# Patient Record
Sex: FEMALE | Race: BLACK OR AFRICAN AMERICAN | Employment: OTHER | URBAN - METROPOLITAN AREA
[De-identification: names, ages, dates, MRNs, and addresses within clinical notes are randomized per-mention and may not be internally consistent; named-entity substitution may affect disease eponyms.]

---

## 2018-03-23 ENCOUNTER — OFFICE VISIT (OUTPATIENT)
Dept: FAMILY MEDICINE CLINIC | Age: 83
End: 2018-03-23
Payer: MEDICARE

## 2018-03-23 ENCOUNTER — HOSPITAL ENCOUNTER (OUTPATIENT)
Age: 83
Discharge: HOME | End: 2018-03-25
Payer: MEDICARE

## 2018-03-23 VITALS
HEART RATE: 84 BPM | TEMPERATURE: 98.5 F | RESPIRATION RATE: 16 BRPM | HEIGHT: 65 IN | SYSTOLIC BLOOD PRESSURE: 138 MMHG | DIASTOLIC BLOOD PRESSURE: 74 MMHG | BODY MASS INDEX: 32.65 KG/M2 | WEIGHT: 196 LBS | OXYGEN SATURATION: 97 %

## 2018-03-23 DIAGNOSIS — K21.9 GASTROESOPHAGEAL REFLUX DISEASE WITHOUT ESOPHAGITIS: ICD-10-CM

## 2018-03-23 DIAGNOSIS — N39.0 URINARY TRACT INFECTION WITHOUT HEMATURIA, SITE UNSPECIFIED: ICD-10-CM

## 2018-03-23 DIAGNOSIS — E78.5 HYPERLIPIDEMIA, UNSPECIFIED HYPERLIPIDEMIA TYPE: ICD-10-CM

## 2018-03-23 DIAGNOSIS — I10 ESSENTIAL HYPERTENSION: ICD-10-CM

## 2018-03-23 DIAGNOSIS — G47.00 INSOMNIA, UNSPECIFIED TYPE: ICD-10-CM

## 2018-03-23 DIAGNOSIS — M48.061 SPINAL STENOSIS OF LUMBAR REGION WITHOUT NEUROGENIC CLAUDICATION: Primary | ICD-10-CM

## 2018-03-23 LAB
APPEARANCE FLUID: CLEAR
BILIRUBIN, POC: NORMAL
BLOOD URINE, POC: NORMAL
CLARITY, POC: CLEAR
COLOR, POC: YELLOW
GLUCOSE URINE, POC: NORMAL
KETONES, POC: NORMAL
LEUKOCYTE EST, POC: NORMAL
NITRITE, POC: NORMAL
PH, POC: 5.5
PROTEIN, POC: NORMAL
SPECIFIC GRAVITY, POC: 1.02
UROBILINOGEN, POC: 0.2

## 2018-03-23 PROCEDURE — 87088 URINE BACTERIA CULTURE: CPT

## 2018-03-23 PROCEDURE — 99214 OFFICE O/P EST MOD 30 MIN: CPT | Performed by: NURSE PRACTITIONER

## 2018-03-23 RX ORDER — FAMOTIDINE 20 MG/1
20 TABLET, FILM COATED ORAL DAILY
Qty: 60 TABLET | Refills: 1 | Status: CANCELLED | OUTPATIENT
Start: 2018-03-23

## 2018-03-23 RX ORDER — TRAMADOL HYDROCHLORIDE 50 MG/1
50 TABLET ORAL 2 TIMES DAILY
Qty: 60 TABLET | Refills: 0 | Status: SHIPPED | OUTPATIENT
Start: 2018-03-23 | End: 2018-04-24 | Stop reason: SDUPTHER

## 2018-03-23 RX ORDER — ZOLPIDEM TARTRATE 10 MG/1
TABLET ORAL NIGHTLY PRN
Status: CANCELLED | OUTPATIENT
Start: 2018-03-23

## 2018-03-23 RX ORDER — FERROUS SULFATE 325(65) MG
325 TABLET ORAL
Qty: 30 TABLET | Refills: 2 | Status: SHIPPED | OUTPATIENT
Start: 2018-03-23

## 2018-03-23 RX ORDER — TRAZODONE HYDROCHLORIDE 50 MG/1
50 TABLET ORAL NIGHTLY
Qty: 30 TABLET | Refills: 1 | Status: SHIPPED | OUTPATIENT
Start: 2018-03-23 | End: 2018-08-19 | Stop reason: ALTCHOICE

## 2018-03-23 RX ORDER — ATORVASTATIN CALCIUM 10 MG/1
10 TABLET, FILM COATED ORAL DAILY
Qty: 30 TABLET | Refills: 2 | Status: SHIPPED | OUTPATIENT
Start: 2018-03-23 | End: 2018-05-15 | Stop reason: SDUPTHER

## 2018-03-23 RX ORDER — DOCUSATE SODIUM 100 MG/1
100 CAPSULE, LIQUID FILLED ORAL 2 TIMES DAILY
Qty: 60 CAPSULE | Refills: 2 | Status: SHIPPED | OUTPATIENT
Start: 2018-03-23 | End: 2018-05-15 | Stop reason: SDUPTHER

## 2018-03-23 ASSESSMENT — ENCOUNTER SYMPTOMS
BLURRED VISION: 0
DOUBLE VISION: 0
DIARRHEA: 0
BACK PAIN: 1
COUGH: 0
WHEEZING: 0
CONSTIPATION: 1
NAUSEA: 0
SHORTNESS OF BREATH: 0
VOMITING: 0

## 2018-03-25 LAB — URINE CULTURE, ROUTINE: NORMAL

## 2018-04-24 ENCOUNTER — OFFICE VISIT (OUTPATIENT)
Dept: FAMILY MEDICINE CLINIC | Age: 83
End: 2018-04-24
Payer: MEDICARE

## 2018-04-24 VITALS
DIASTOLIC BLOOD PRESSURE: 72 MMHG | HEIGHT: 65 IN | OXYGEN SATURATION: 97 % | HEART RATE: 76 BPM | SYSTOLIC BLOOD PRESSURE: 132 MMHG | TEMPERATURE: 98.2 F | BODY MASS INDEX: 31.82 KG/M2 | WEIGHT: 191 LBS

## 2018-04-24 DIAGNOSIS — G47.00 INSOMNIA, UNSPECIFIED TYPE: ICD-10-CM

## 2018-04-24 DIAGNOSIS — K21.9 GASTROESOPHAGEAL REFLUX DISEASE WITHOUT ESOPHAGITIS: ICD-10-CM

## 2018-04-24 DIAGNOSIS — M48.061 SPINAL STENOSIS OF LUMBAR REGION WITHOUT NEUROGENIC CLAUDICATION: Primary | ICD-10-CM

## 2018-04-24 PROCEDURE — 99214 OFFICE O/P EST MOD 30 MIN: CPT | Performed by: NURSE PRACTITIONER

## 2018-04-24 RX ORDER — PANTOPRAZOLE SODIUM 40 MG/1
40 TABLET, DELAYED RELEASE ORAL DAILY
Qty: 30 TABLET | Refills: 3 | Status: SHIPPED | OUTPATIENT
Start: 2018-04-24 | End: 2018-07-16 | Stop reason: ALTCHOICE

## 2018-04-24 RX ORDER — TRAMADOL HYDROCHLORIDE 50 MG/1
50 TABLET ORAL 2 TIMES DAILY
Qty: 60 TABLET | Refills: 0 | Status: SHIPPED | OUTPATIENT
Start: 2018-04-24 | End: 2018-05-15 | Stop reason: SDUPTHER

## 2018-04-24 RX ORDER — ZOLPIDEM TARTRATE 10 MG/1
5 TABLET ORAL NIGHTLY PRN
Qty: 30 TABLET | Refills: 0 | Status: SHIPPED | OUTPATIENT
Start: 2018-04-24 | End: 2018-05-15 | Stop reason: SDUPTHER

## 2018-04-24 ASSESSMENT — ENCOUNTER SYMPTOMS
DIARRHEA: 0
DOUBLE VISION: 0
VOMITING: 0
BLURRED VISION: 0
COUGH: 0
BACK PAIN: 1
CONSTIPATION: 0
SHORTNESS OF BREATH: 0
NAUSEA: 0
WHEEZING: 0

## 2018-05-15 ENCOUNTER — OFFICE VISIT (OUTPATIENT)
Dept: FAMILY MEDICINE CLINIC | Age: 83
End: 2018-05-15
Payer: MEDICARE

## 2018-05-15 VITALS
OXYGEN SATURATION: 98 % | SYSTOLIC BLOOD PRESSURE: 136 MMHG | WEIGHT: 192 LBS | BODY MASS INDEX: 31.99 KG/M2 | DIASTOLIC BLOOD PRESSURE: 74 MMHG | TEMPERATURE: 98.9 F | HEIGHT: 65 IN | HEART RATE: 84 BPM

## 2018-05-15 DIAGNOSIS — E78.5 HYPERLIPIDEMIA, UNSPECIFIED HYPERLIPIDEMIA TYPE: ICD-10-CM

## 2018-05-15 DIAGNOSIS — M48.061 SPINAL STENOSIS OF LUMBAR REGION WITHOUT NEUROGENIC CLAUDICATION: ICD-10-CM

## 2018-05-15 DIAGNOSIS — I10 HYPERTENSION, UNSPECIFIED TYPE: ICD-10-CM

## 2018-05-15 DIAGNOSIS — G47.00 INSOMNIA, UNSPECIFIED TYPE: ICD-10-CM

## 2018-05-15 DIAGNOSIS — K62.5 RECTAL BLEEDING: Primary | ICD-10-CM

## 2018-05-15 PROCEDURE — 99214 OFFICE O/P EST MOD 30 MIN: CPT | Performed by: NURSE PRACTITIONER

## 2018-05-15 RX ORDER — RAMIPRIL 10 MG/1
10 CAPSULE ORAL DAILY
Qty: 30 CAPSULE | Refills: 3 | Status: SHIPPED | OUTPATIENT
Start: 2018-05-15

## 2018-05-15 RX ORDER — DOCUSATE SODIUM 100 MG/1
100 CAPSULE, LIQUID FILLED ORAL 2 TIMES DAILY
Qty: 60 CAPSULE | Refills: 2 | Status: SHIPPED | OUTPATIENT
Start: 2018-05-15

## 2018-05-15 RX ORDER — TRAMADOL HYDROCHLORIDE 50 MG/1
50 TABLET ORAL 2 TIMES DAILY
Qty: 60 TABLET | Refills: 0 | Status: SHIPPED | OUTPATIENT
Start: 2018-05-15 | End: 2018-06-14

## 2018-05-15 RX ORDER — ZOLPIDEM TARTRATE 10 MG/1
5 TABLET ORAL NIGHTLY PRN
Qty: 30 TABLET | Refills: 1 | Status: SHIPPED | OUTPATIENT
Start: 2018-05-15 | End: 2018-06-14

## 2018-05-15 RX ORDER — ATORVASTATIN CALCIUM 10 MG/1
10 TABLET, FILM COATED ORAL DAILY
Qty: 30 TABLET | Refills: 2 | Status: SHIPPED | OUTPATIENT
Start: 2018-05-15

## 2018-05-15 ASSESSMENT — ENCOUNTER SYMPTOMS
DIARRHEA: 0
WHEEZING: 0
SHORTNESS OF BREATH: 0
DOUBLE VISION: 0
CONSTIPATION: 0
VOMITING: 0
BLURRED VISION: 0
NAUSEA: 0
COUGH: 0

## 2018-06-06 ENCOUNTER — TELEPHONE (OUTPATIENT)
Dept: SURGERY | Age: 83
End: 2018-06-06

## 2018-07-13 ENCOUNTER — OFFICE VISIT (OUTPATIENT)
Dept: SURGERY | Age: 83
End: 2018-07-13
Payer: MEDICARE

## 2018-07-13 VITALS
RESPIRATION RATE: 16 BRPM | SYSTOLIC BLOOD PRESSURE: 140 MMHG | DIASTOLIC BLOOD PRESSURE: 72 MMHG | HEIGHT: 65 IN | WEIGHT: 193 LBS | OXYGEN SATURATION: 98 % | BODY MASS INDEX: 32.15 KG/M2 | HEART RATE: 87 BPM

## 2018-07-13 DIAGNOSIS — K62.5 BLOOD PER RECTUM: ICD-10-CM

## 2018-07-13 PROCEDURE — 99203 OFFICE O/P NEW LOW 30 MIN: CPT | Performed by: SURGERY

## 2018-07-13 RX ORDER — ZOLPIDEM TARTRATE 10 MG/1
TABLET ORAL NIGHTLY PRN
COMMUNITY

## 2018-07-13 RX ORDER — TRAMADOL HYDROCHLORIDE 50 MG/1
50 TABLET ORAL EVERY 6 HOURS PRN
COMMUNITY

## 2018-07-13 RX ORDER — MAGNESIUM CITRATE
600 SOLUTION, ORAL ORAL ONCE
Qty: 2 BOTTLE | Refills: 0 | Status: SHIPPED | OUTPATIENT
Start: 2018-07-13 | End: 2018-07-13

## 2018-07-16 ENCOUNTER — TELEPHONE (OUTPATIENT)
Dept: SURGERY | Age: 83
End: 2018-07-16

## 2018-07-27 ENCOUNTER — ANESTHESIA EVENT (OUTPATIENT)
Dept: ENDOSCOPY | Age: 83
End: 2018-07-27
Payer: MEDICARE

## 2018-07-27 ENCOUNTER — ANESTHESIA (OUTPATIENT)
Dept: ENDOSCOPY | Age: 83
End: 2018-07-27
Payer: MEDICARE

## 2018-07-27 ENCOUNTER — HOSPITAL ENCOUNTER (OUTPATIENT)
Age: 83
Setting detail: OUTPATIENT SURGERY
Discharge: HOME | End: 2018-07-27
Attending: SURGERY | Admitting: SURGERY
Payer: MEDICARE

## 2018-07-27 VITALS
DIASTOLIC BLOOD PRESSURE: 74 MMHG | WEIGHT: 197 LBS | SYSTOLIC BLOOD PRESSURE: 155 MMHG | HEART RATE: 64 BPM | HEIGHT: 65 IN | BODY MASS INDEX: 32.82 KG/M2 | RESPIRATION RATE: 20 BRPM | OXYGEN SATURATION: 98 % | TEMPERATURE: 97.2 F

## 2018-07-27 VITALS
OXYGEN SATURATION: 100 % | RESPIRATION RATE: 15 BRPM | SYSTOLIC BLOOD PRESSURE: 146 MMHG | DIASTOLIC BLOOD PRESSURE: 63 MMHG

## 2018-07-27 PROCEDURE — 2580000003 HC RX 258: Performed by: SURGERY

## 2018-07-27 PROCEDURE — 3609009500 HC COLONOSCOPY DIAGNOSTIC OR SCREENING: Performed by: SURGERY

## 2018-07-27 PROCEDURE — G0121 COLON CA SCRN NOT HI RSK IND: HCPCS | Performed by: SURGERY

## 2018-07-27 PROCEDURE — 6360000002 HC RX W HCPCS: Performed by: NURSE ANESTHETIST, CERTIFIED REGISTERED

## 2018-07-27 PROCEDURE — 3700000001 HC ADD 15 MINUTES (ANESTHESIA): Performed by: SURGERY

## 2018-07-27 PROCEDURE — 3700000000 HC ANESTHESIA ATTENDED CARE: Performed by: SURGERY

## 2018-07-27 PROCEDURE — 7100000011 HC PHASE II RECOVERY - ADDTL 15 MIN: Performed by: SURGERY

## 2018-07-27 PROCEDURE — 2709999900 HC NON-CHARGEABLE SUPPLY: Performed by: SURGERY

## 2018-07-27 PROCEDURE — 7100000010 HC PHASE II RECOVERY - FIRST 15 MIN: Performed by: SURGERY

## 2018-07-27 PROCEDURE — 2580000003 HC RX 258: Performed by: NURSE ANESTHETIST, CERTIFIED REGISTERED

## 2018-07-27 RX ORDER — PROPOFOL 10 MG/ML
INJECTION, EMULSION INTRAVENOUS PRN
Status: DISCONTINUED | OUTPATIENT
Start: 2018-07-27 | End: 2018-07-27 | Stop reason: SDUPTHER

## 2018-07-27 RX ORDER — SODIUM CHLORIDE 9 MG/ML
INJECTION, SOLUTION INTRAVENOUS CONTINUOUS PRN
Status: DISCONTINUED | OUTPATIENT
Start: 2018-07-27 | End: 2018-07-27 | Stop reason: SDUPTHER

## 2018-07-27 RX ORDER — SODIUM CHLORIDE 9 MG/ML
INJECTION, SOLUTION INTRAVENOUS CONTINUOUS
Status: DISCONTINUED | OUTPATIENT
Start: 2018-07-27 | End: 2018-07-27 | Stop reason: HOSPADM

## 2018-07-27 RX ORDER — 0.9 % SODIUM CHLORIDE 0.9 %
10 VIAL (ML) INJECTION EVERY 12 HOURS SCHEDULED
Status: DISCONTINUED | OUTPATIENT
Start: 2018-07-27 | End: 2018-07-27 | Stop reason: SDUPTHER

## 2018-07-27 RX ORDER — SODIUM CHLORIDE 9 MG/ML
INJECTION, SOLUTION INTRAVENOUS CONTINUOUS
Status: DISCONTINUED | OUTPATIENT
Start: 2018-07-27 | End: 2018-07-27 | Stop reason: SDUPTHER

## 2018-07-27 RX ORDER — 0.9 % SODIUM CHLORIDE 0.9 %
10 VIAL (ML) INJECTION PRN
Status: DISCONTINUED | OUTPATIENT
Start: 2018-07-27 | End: 2018-07-27 | Stop reason: SDUPTHER

## 2018-07-27 RX ORDER — SODIUM CHLORIDE 0.9 % (FLUSH) 0.9 %
10 SYRINGE (ML) INJECTION EVERY 12 HOURS SCHEDULED
Status: DISCONTINUED | OUTPATIENT
Start: 2018-07-27 | End: 2018-07-27 | Stop reason: HOSPADM

## 2018-07-27 RX ORDER — SODIUM CHLORIDE 0.9 % (FLUSH) 0.9 %
10 SYRINGE (ML) INJECTION PRN
Status: DISCONTINUED | OUTPATIENT
Start: 2018-07-27 | End: 2018-07-27 | Stop reason: HOSPADM

## 2018-07-27 RX ADMIN — PROPOFOL 150 MG: 10 INJECTION, EMULSION INTRAVENOUS at 07:04

## 2018-07-27 RX ADMIN — SODIUM CHLORIDE: 9 INJECTION, SOLUTION INTRAVENOUS at 07:02

## 2018-07-27 RX ADMIN — SODIUM CHLORIDE: 9 INJECTION, SOLUTION INTRAVENOUS at 06:57

## 2018-07-27 ASSESSMENT — PAIN DESCRIPTION - PROGRESSION
CLINICAL_PROGRESSION: NOT CHANGED

## 2018-07-27 ASSESSMENT — PAIN SCALES - GENERAL
PAINLEVEL_OUTOF10: 0

## 2018-07-27 ASSESSMENT — PAIN DESCRIPTION - LOCATION
LOCATION: ABDOMEN

## 2018-07-27 ASSESSMENT — PAIN DESCRIPTION - PAIN TYPE: TYPE: OTHER (COMMENT)

## 2018-07-27 ASSESSMENT — PAIN - FUNCTIONAL ASSESSMENT: PAIN_FUNCTIONAL_ASSESSMENT: 0-10

## 2018-07-27 ASSESSMENT — PAIN DESCRIPTION - DESCRIPTORS: DESCRIPTORS: DISCOMFORT

## 2018-07-27 NOTE — PROGRESS NOTES
0730 admit to phase 2 pacu from endoscopy on cart. Call light within reach. Daughters remain here with patient. 1423 alert. Vss. Continues to deny any post procedure discomfort or nausea. Up, dressed, activity tolerated well. Discharge criteria met.

## 2018-07-27 NOTE — ANESTHESIA PRE PROCEDURE
Department of Anesthesiology  Preprocedure Note       Name:  Laura Prior   Age:  80 y.o.  :  10/19/1933                                          MRN:  [id number]         Date:  2018      Surgeon: Fred Chavez):  Trey Sarabia MD    Procedure: Procedure(s):  COLONOSCOPY DIAGNOSTIC    Medications prior to admission:   Prior to Admission medications    Medication Sig Start Date End Date Taking? Authorizing Provider   zolpidem (AMBIEN) 10 MG tablet Take by mouth nightly as needed for Sleep. Edwardo [facility] Historical Provider, MD   traMADol (ULTRAM) 50 MG tablet Take 50 mg by mouth every 6 hours as needed for Pain. Instructed to take am of procedure if needed. Historical Provider, MD   bisacodyl (BISACODYL) 5 MG EC tablet Take 4 tablets at once as part of colonoscopy prep 18   Trey Sarabia MD   docusate sodium (COLACE) 100 MG capsule Take 1 capsule by mouth 2 times daily 5/15/18   ALON Garcia CNP   atorvastatin (LIPITOR) 10 MG tablet Take 1 tablet by mouth daily 5/15/18   ALON Garcia CNP   aspirin 81 MG tablet Take 1 tablet by mouth daily 5/15/18   ALON Garcia CNP   ramipril (ALTACE) 10 MG capsule Take 1 capsule by mouth daily 5/15/18   ALON Garcia CNP   ferrous sulfate 325 (65 Fe) MG tablet Take 1 tablet by mouth daily (with breakfast) 3/23/18   ALON Garcia CNP   traZODone (DESYREL) 50 MG tablet Take 1 tablet by mouth nightly 3/23/18   ALON Garcia CNP   famotidine (PEPCID) 20 MG tablet Take 1 tablet by mouth daily 18   ALON Garcia CNP       Current medications:    No current facility-administered medications for this encounter.         Allergies:  No Known Allergies    Problem List:    Patient Active Problem List   Diagnosis Code    Abnormal EKG R94.31    Hyperlipidemia E78.5    Hypertension I10    Blood per rectum K62.5       Past Medical History:        Diagnosis Date    Arthritis     Depression     Encounter for screening colonoscopy     Hyperlipidemia     Hypertension     Thyroid disease     Thyroid nodule        Past Surgical History:        Procedure Laterality Date    HYSTERECTOMY      THYROID SURGERY  2001    nodule removed       Social History:    Social History   Substance Use Topics    Smoking status: Never Smoker    Smokeless tobacco: Never Used    Alcohol use No                                Counseling given: Not Answered      Vital Signs (Current):   Vitals:    07/16/18 1420   Weight: 197 lb (89.4 kg)   Height: 5' 5\" (1.651 m)                                              BP Readings from Last 3 Encounters:   07/13/18 (!) 140/72   05/15/18 136/74   04/24/18 132/72       NPO Status:                                                                                 BMI:   Wt Readings from Last 3 Encounters:   07/16/18 197 lb (89.4 kg)   07/13/18 193 lb (87.5 kg)   05/15/18 192 lb (87.1 kg)     Body mass index is 32.78 kg/m². CBC:   Lab Results   Component Value Date    WBC 5.4 02/23/2018    RBC 4.32 02/23/2018    HGB 13.4 02/23/2018    HCT 41.8 02/23/2018    MCV 96.8 02/23/2018    RDW 13.3 02/23/2018     02/23/2018       CMP:   Lab Results   Component Value Date     02/23/2018    K 4.7 02/23/2018     02/23/2018    CO2 23 02/23/2018    BUN 13 02/23/2018    CREATININE 0.8 02/23/2018    GFRAA >60 02/23/2018    LABGLOM >60 02/23/2018    GLUCOSE 79 02/23/2018    GLUCOSE 110 02/24/2011    PROT 7.9 02/23/2018    CALCIUM 9.7 02/23/2018    BILITOT <0.2 02/23/2018    ALKPHOS 71 02/23/2018    AST 19 02/23/2018    ALT 12 02/23/2018       POC Tests: No results for input(s): POCGLU, POCNA, POCK, POCCL, POCBUN, POCHEMO, POCHCT in the last 72 hours.     Coags: No results found for: PROTIME, INR, APTT    HCG (If Applicable): No results found for: PREGTESTUR, PREGSERUM, HCG, HCGQUANT     ABGs: No results found for: PHART, PO2ART, UHK5TXT, TLR9AUY, BEART, P6DFYDNW     Type & Screen (If

## 2018-07-27 NOTE — H&P
[facility]  HISTORY & PHYSICAL      CC:  Blood per rectum    HPI:     Jessica Noguera is here for an initial office visit (7/27/2018). The patient was sent here to discuss colorectal cancer screening. The patient denies any weight loss,  abdominal pain, or change in bowel habits. There is no family history of IBD or colorectal cancer. She has some blood in her stool that is bright red. She has a history of colonoscopy years ago and diverticulosis was noted. Past Medical History:   Diagnosis Date    Arthritis     Depression     Encounter for screening colonoscopy     Hyperlipidemia     Hypertension     Thyroid disease     Thyroid nodule      Past Surgical History:   Procedure Laterality Date    COLONOSCOPY      ENDOSCOPY, COLON, DIAGNOSTIC      HYSTERECTOMY      THYROID SURGERY  2001    nodule removed     Social History     Social History    Marital status:      Spouse name: N/A    Number of children: N/A    Years of education: N/A     Occupational History    Not on file. Social History Main Topics    Smoking status: Never Smoker    Smokeless tobacco: Never Used    Alcohol use No    Drug use: No    Sexual activity: Not on file     Other Topics Concern    Not on file     Social History Narrative    No narrative on file     No Known Allergies     I have reviewed and confirmed the past medical history, surgical history, social history, allergies in the chart. Medications: I have reviewed the medication list in the chart.     Review of Systems:  Review of Systems - History obtained from the patient  General ROS: negative  Psychological ROS: negative  Ophthalmic ROS: negative for - blurry vision, double vision or loss of vision  ENT ROS: negative for - epistaxis, sore throat, vocal changes or malocclusion  Respiratory ROS: negative for - pleuritic pain, shortness of breath or tachypnea  Cardiovascular ROS: negative for - chest pain or palpitations  Gastrointestinal ROS: negative for - abdominal pain or gas/bloating  Genito-Urinary ROS: negative for - hematuria or pelvic pain  Endocrine ROS: negative   Heme ROS: negative   Musculoskeletal ROS: negative  Neurological ROS: negative for - confusion, dizziness, headaches, numbness/tingling, seizures or weakness    Physical Exam   BP (!) 170/79   Pulse 75   Temp 97.5 °F (36.4 °C) (Temporal)   Resp 20   Ht 5' 5\" (1.651 m)   Wt 197 lb (89.4 kg)   SpO2 98%   BMI 32.78 kg/m²   Vitals:    07/27/18 0645   BP: (!) 170/79   Pulse: 75   Resp: 20   Temp: 97.5 °F (36.4 °C)   SpO2: 98%       PSYCH: mood and affect normal, alert and oriented x 3  CONSTITUTIONAL: No apparent distress, comfortable  EYES: Sclera white, pupils equal round and reactive to light  ENMT:  Hearing normal, trachea midline, ears externally intact  LYMPH: no lympadenopathy in neck. No lympadenopathy in groins  RESP: Breath sounds were clear and equal with no rales, wheezes, or rhonchi. Respiratory effort was normal with no retractions or use of accessory muscles. CV: Heart sounds were normal with a regular rate and rhythm. No pedal edema  GI/ Abdomen: The abdomen was soft and non distended. There was no tenderness, guarding, rebound, or rigidity. There was no                     masses, hepatosplenomegaly, or hernias. Rectal -Deferred  MSK: no clubbing/ no cyanosis/ gait normal       Assessment:    Average risk for colorectal cancer   Blood per rectum    Plan:  1. Screening colonoscopy  I discussed the options for colorectal cancer screening with the patient including options of fecal occult blood testing with flexible sigmoidoscopy or air contrast barium enema. I also discussed the risks and benefits of colonoscopy with possible biopsy/polypectomy/cauterization. I recommended colonoscopy with deep sedation.   The patient understands the risks of bleeding and perforation (<0.1%) and agrees to proceed.   2 days of clear liquids prior to procedure  Magcitrate/ dulcolax prep  Schedule at [address] MD [place], [facility]  7/27/2018  6:57 AM

## 2018-07-27 NOTE — LETTER
Doreen Solo  [address] [facility] [address]  Phone: [phone]  Fax:  [phone]    July 27, 2018     Mari Daugherty DO  [address] [place] [address] [address]  [facility] [address]    Patient: Chandni Garcia  MR Number: [id number]  YOB: 1933  Date of Visit: 7/16/2018    Dear Dr. Mari Daugherty: Thank you for the request for consultation for Chandni Garcia to me for the evaluation of blood per rectum. Below are the relevant portions of my assessment and plan of care. I performed a colonoscopy on her today. If you have questions, please do not hesitate to call me. I look forward to following Luzmaria along with you.     Sincerely,     Electronically signed by Wilburn Leyden, MD on 7/27/2018 at 8:06 AM

## 2018-08-13 RX ORDER — PANTOPRAZOLE SODIUM 40 MG/1
40 TABLET, DELAYED RELEASE ORAL DAILY
Qty: 30 TABLET | Refills: 3 | Status: SHIPPED | OUTPATIENT
Start: 2018-08-13 | End: 2018-08-19 | Stop reason: ALTCHOICE

## 2018-08-19 ENCOUNTER — HOSPITAL ENCOUNTER (EMERGENCY)
Age: 83
Discharge: HOME | End: 2018-08-19
Attending: EMERGENCY MEDICINE
Payer: MEDICARE

## 2018-08-19 ENCOUNTER — APPOINTMENT (OUTPATIENT)
Dept: CT IMAGING | Age: 83
End: 2018-08-19
Payer: MEDICARE

## 2018-08-19 VITALS
HEIGHT: 65 IN | TEMPERATURE: 98.1 F | RESPIRATION RATE: 16 BRPM | SYSTOLIC BLOOD PRESSURE: 164 MMHG | HEART RATE: 62 BPM | WEIGHT: 194 LBS | BODY MASS INDEX: 32.32 KG/M2 | OXYGEN SATURATION: 98 % | DIASTOLIC BLOOD PRESSURE: 75 MMHG

## 2018-08-19 DIAGNOSIS — K57.32 DIVERTICULITIS OF LARGE INTESTINE WITHOUT PERFORATION OR ABSCESS WITHOUT BLEEDING: Primary | ICD-10-CM

## 2018-08-19 DIAGNOSIS — R19.00 PELVIC MASS IN FEMALE: ICD-10-CM

## 2018-08-19 DIAGNOSIS — R10.32 LEFT LOWER QUADRANT PAIN: ICD-10-CM

## 2018-08-19 LAB
ALBUMIN SERPL-MCNC: 3.9 G/DL (ref 3.5–5.2)
ALP BLD-CCNC: 71 U/L (ref 35–104)
ALT SERPL-CCNC: 13 U/L (ref 0–32)
ANION GAP SERPL CALCULATED.3IONS-SCNC: 9 MMOL/L (ref 7–16)
AST SERPL-CCNC: 19 U/L (ref 0–31)
BACTERIA: NORMAL /HPF
BASOPHILS ABSOLUTE: 0.03 E9/L (ref 0–0.2)
BASOPHILS RELATIVE PERCENT: 0.6 % (ref 0–2)
BILIRUB SERPL-MCNC: 0.3 MG/DL (ref 0–1.2)
BILIRUBIN URINE: NEGATIVE
BLOOD, URINE: NORMAL
BUN BLDV-MCNC: 10 MG/DL (ref 8–23)
CALCIUM SERPL-MCNC: 9.6 MG/DL (ref 8.6–10.2)
CHLORIDE BLD-SCNC: 103 MMOL/L (ref 98–107)
CLARITY: CLEAR
CO2: 28 MMOL/L (ref 22–29)
COLOR: YELLOW
CREAT SERPL-MCNC: 0.8 MG/DL (ref 0.5–1)
EKG ATRIAL RATE: 60 BPM
EKG P AXIS: 55 DEGREES
EKG P-R INTERVAL: 176 MS
EKG Q-T INTERVAL: 472 MS
EKG QRS DURATION: 148 MS
EKG QTC CALCULATION (BAZETT): 472 MS
EKG R AXIS: -27 DEGREES
EKG T AXIS: -8 DEGREES
EKG VENTRICULAR RATE: 60 BPM
EOSINOPHILS ABSOLUTE: 0.05 E9/L (ref 0.05–0.5)
EOSINOPHILS RELATIVE PERCENT: 1.1 % (ref 0–6)
EPITHELIAL CELLS, UA: NORMAL /HPF
GFR AFRICAN AMERICAN: >60
GFR NON-AFRICAN AMERICAN: >60 ML/MIN/1.73
GLUCOSE BLD-MCNC: 96 MG/DL (ref 74–109)
GLUCOSE URINE: NEGATIVE MG/DL
HCT VFR BLD CALC: 41.4 % (ref 34–48)
HEMOGLOBIN: 13.4 G/DL (ref 11.5–15.5)
IMMATURE GRANULOCYTES #: 0.01 E9/L
IMMATURE GRANULOCYTES %: 0.2 % (ref 0–5)
KETONES, URINE: NEGATIVE MG/DL
LACTIC ACID: 2.1 MMOL/L (ref 0.5–2.2)
LEUKOCYTE ESTERASE, URINE: NEGATIVE
LYMPHOCYTES ABSOLUTE: 2.02 E9/L (ref 1.5–4)
LYMPHOCYTES RELATIVE PERCENT: 43.7 % (ref 20–42)
MCH RBC QN AUTO: 30.9 PG (ref 26–35)
MCHC RBC AUTO-ENTMCNC: 32.4 % (ref 32–34.5)
MCV RBC AUTO: 95.6 FL (ref 80–99.9)
MONOCYTES ABSOLUTE: 0.46 E9/L (ref 0.1–0.95)
MONOCYTES RELATIVE PERCENT: 10 % (ref 2–12)
NEUTROPHILS ABSOLUTE: 2.05 E9/L (ref 1.8–7.3)
NEUTROPHILS RELATIVE PERCENT: 44.4 % (ref 43–80)
NITRITE, URINE: NEGATIVE
PDW BLD-RTO: 12.7 FL (ref 11.5–15)
PH UA: 6 (ref 5–9)
PLATELET # BLD: 184 E9/L (ref 130–450)
PMV BLD AUTO: 11.1 FL (ref 7–12)
POTASSIUM SERPL-SCNC: 4.9 MMOL/L (ref 3.5–5)
PROTEIN UA: NEGATIVE MG/DL
RBC # BLD: 4.33 E12/L (ref 3.5–5.5)
RBC UA: NORMAL /HPF (ref 0–2)
SODIUM BLD-SCNC: 140 MMOL/L (ref 132–146)
SPECIFIC GRAVITY UA: 1.01 (ref 1–1.03)
TOTAL PROTEIN: 7.9 G/DL (ref 6.4–8.3)
TROPONIN: <0.01 NG/ML (ref 0–0.03)
UROBILINOGEN, URINE: 0.2 E.U./DL
WBC # BLD: 4.6 E9/L (ref 4.5–11.5)
WBC UA: NORMAL /HPF (ref 0–5)

## 2018-08-19 PROCEDURE — 83605 ASSAY OF LACTIC ACID: CPT

## 2018-08-19 PROCEDURE — 84484 ASSAY OF TROPONIN QUANT: CPT

## 2018-08-19 PROCEDURE — 96374 THER/PROPH/DIAG INJ IV PUSH: CPT

## 2018-08-19 PROCEDURE — 99284 EMERGENCY DEPT VISIT MOD MDM: CPT

## 2018-08-19 PROCEDURE — 93005 ELECTROCARDIOGRAM TRACING: CPT | Performed by: EMERGENCY MEDICINE

## 2018-08-19 PROCEDURE — 2580000003 HC RX 258: Performed by: EMERGENCY MEDICINE

## 2018-08-19 PROCEDURE — 87088 URINE BACTERIA CULTURE: CPT

## 2018-08-19 PROCEDURE — 81001 URINALYSIS AUTO W/SCOPE: CPT

## 2018-08-19 PROCEDURE — 80053 COMPREHEN METABOLIC PANEL: CPT

## 2018-08-19 PROCEDURE — 6360000002 HC RX W HCPCS: Performed by: EMERGENCY MEDICINE

## 2018-08-19 PROCEDURE — 85025 COMPLETE CBC W/AUTO DIFF WBC: CPT

## 2018-08-19 PROCEDURE — 6360000004 HC RX CONTRAST MEDICATION: Performed by: RADIOLOGY

## 2018-08-19 PROCEDURE — 74178 CT ABD&PLV WO CNTR FLWD CNTR: CPT

## 2018-08-19 RX ORDER — 0.9 % SODIUM CHLORIDE 0.9 %
1000 INTRAVENOUS SOLUTION INTRAVENOUS ONCE
Status: COMPLETED | OUTPATIENT
Start: 2018-08-19 | End: 2018-08-19

## 2018-08-19 RX ORDER — ONDANSETRON 2 MG/ML
4 INJECTION INTRAMUSCULAR; INTRAVENOUS
Status: COMPLETED | OUTPATIENT
Start: 2018-08-19 | End: 2018-08-19

## 2018-08-19 RX ORDER — METRONIDAZOLE 500 MG/1
500 TABLET ORAL 2 TIMES DAILY
Qty: 20 TABLET | Refills: 0 | Status: SHIPPED | OUTPATIENT
Start: 2018-08-19 | End: 2018-08-29

## 2018-08-19 RX ORDER — SODIUM CHLORIDE 0.9 % (FLUSH) 0.9 %
10 SYRINGE (ML) INJECTION PRN
Status: DISCONTINUED | OUTPATIENT
Start: 2018-08-19 | End: 2018-08-19 | Stop reason: HOSPADM

## 2018-08-19 RX ORDER — CEFDINIR 300 MG/1
300 CAPSULE ORAL 2 TIMES DAILY
Qty: 20 CAPSULE | Refills: 0 | Status: SHIPPED | OUTPATIENT
Start: 2018-08-19 | End: 2018-08-29

## 2018-08-19 RX ADMIN — SODIUM CHLORIDE 1000 ML: 9 INJECTION, SOLUTION INTRAVENOUS at 09:10

## 2018-08-19 RX ADMIN — IOPAMIDOL 110 ML: 755 INJECTION, SOLUTION INTRAVENOUS at 10:08

## 2018-08-19 RX ADMIN — Medication 10 ML: at 09:17

## 2018-08-19 RX ADMIN — ONDANSETRON HYDROCHLORIDE 4 MG: 2 INJECTION, SOLUTION INTRAMUSCULAR; INTRAVENOUS at 09:17

## 2018-08-19 ASSESSMENT — ENCOUNTER SYMPTOMS
EYE PAIN: 0
DIARRHEA: 0
VOMITING: 0
SHORTNESS OF BREATH: 0
BLOOD IN STOOL: 0
BACK PAIN: 0
COLOR CHANGE: 0
CHEST TIGHTNESS: 0
ABDOMINAL PAIN: 1
NAUSEA: 1
COUGH: 0

## 2018-08-19 ASSESSMENT — PAIN DESCRIPTION - ONSET: ONSET: ON-GOING

## 2018-08-19 ASSESSMENT — PAIN SCALES - GENERAL: PAINLEVEL_OUTOF10: 8

## 2018-08-19 ASSESSMENT — PAIN DESCRIPTION - ORIENTATION: ORIENTATION: RIGHT;LEFT

## 2018-08-19 ASSESSMENT — PAIN DESCRIPTION - FREQUENCY: FREQUENCY: INTERMITTENT

## 2018-08-19 ASSESSMENT — PAIN DESCRIPTION - PAIN TYPE: TYPE: ACUTE PAIN

## 2018-08-19 ASSESSMENT — PAIN DESCRIPTION - DESCRIPTORS: DESCRIPTORS: SORE

## 2018-08-19 ASSESSMENT — PAIN DESCRIPTION - LOCATION: LOCATION: GROIN

## 2018-08-19 ASSESSMENT — PAIN DESCRIPTION - PROGRESSION: CLINICAL_PROGRESSION: GRADUALLY WORSENING

## 2018-08-19 NOTE — ED PROVIDER NOTES
Patient is an 80-year-old female with a history of back pain, hypertension, hyperlipidemia, hypothyroidism presenting with diffuse abdominal pain for the past several weeks. States she was recently evaluated by colonoscopy for rectal bleeding which had terminated prior to time of scope. Colonoscopy was reportedly unremarkable, however, she states she has a history of diverticulitis. No recent antibiotics. States she has been taking her tramadol for her back pain which has improved her abdominal pain. She states her pain is currently mild to moderate in severity, worse with palpation. She does note associated mild nausea without emesis. Denies fever, chills, chest pain, shortness of breath, back pain, dysuria, hematuria, vaginal bleeding or discharge. The history is provided by the patient. Abdominal Pain   Pain location:  RLQ and LLQ  Pain quality: cramping    Pain radiates to:  Groin  Pain severity:  Moderate  Onset quality:  Gradual  Duration:  2 weeks  Timing:  Intermittent  Progression:  Waxing and waning  Chronicity:  New  Context: previous surgery    Context: not alcohol use, not diet changes, not eating, not retching, not sick contacts, not suspicious food intake and not trauma    Relieved by: Tramadol. Worsened by:  Palpation  Associated symptoms: nausea    Associated symptoms: no chest pain, no chills, no cough, no diarrhea, no dysuria, no fever, no hematuria, no shortness of breath, no vaginal bleeding, no vaginal discharge and no vomiting    Risk factors: being elderly    Risk factors: not obese        Review of Systems   Constitutional: Negative for chills, diaphoresis and fever. Eyes: Negative for pain and visual disturbance. Respiratory: Negative for cough, chest tightness and shortness of breath. Cardiovascular: Negative for chest pain, palpitations and leg swelling. Gastrointestinal: Positive for abdominal pain and nausea. Negative for blood in stool, diarrhea and vomiting. Genitourinary: Negative for dysuria, flank pain, frequency, hematuria, menstrual problem, pelvic pain, vaginal bleeding and vaginal discharge. Musculoskeletal: Negative for back pain and neck pain. Skin: Negative for color change, rash and wound. Neurological: Negative for dizziness, syncope, weakness and light-headedness. Physical Exam   Constitutional: She is oriented to person, place, and time. She appears well-developed and well-nourished. No distress. HENT:   Head: Normocephalic and atraumatic. Mouth/Throat: Oropharynx is clear and moist.   Eyes: Pupils are equal, round, and reactive to light. EOM are normal. No scleral icterus. Neck: Normal range of motion. Neck supple. No JVD present. Cardiovascular: Normal rate, regular rhythm, S1 normal, S2 normal and normal heart sounds. No murmur heard. Pulses:       Radial pulses are 2+ on the right side, and 2+ on the left side. Dorsalis pedis pulses are 2+ on the right side, and 2+ on the left side. Pulmonary/Chest: Effort normal and breath sounds normal. No accessory muscle usage. No respiratory distress. She has no wheezes. She has no rhonchi. She has no rales. Abdominal: Soft. Normal appearance and bowel sounds are normal. She exhibits no distension. There is tenderness (LLQ). There is no rigidity, no rebound, no guarding and no CVA tenderness. Musculoskeletal: Normal range of motion. She exhibits no edema. Lymphadenopathy:     She has no cervical adenopathy. Neurological: She is alert and oriented to person, place, and time. Skin: Skin is warm and dry. No rash noted. She is not diaphoretic. No pallor. Nursing note and vitals reviewed. Procedures    MDM    ED Course as of Aug 19 0948   Alina Ware Aug 19, 2018   0945 Patient declining pain medication at this time, states she will advise when pain control desired.   [RU]      ED Course User Index  [RU] Arturo Rosa DO --------------------------------------------- PAST HISTORY ---------------------------------------------  Past Medical History:  has a past medical history of Arthritis; Encounter for screening colonoscopy; Hyperlipidemia; Hypertension; Thyroid disease; and Thyroid nodule. Past Surgical History:  has a past surgical history that includes Hysterectomy; Thyroid surgery (2001); Colonoscopy; Endoscopy, colon, diagnostic; and pr colonoscopy flx dx w/collj spec when pfrmd (N/A, 7/27/2018). Social History:  reports that she has never smoked. She has never used smokeless tobacco. She reports that she does not drink alcohol or use drugs. Family History: family history is not on file. The patients home medications have been reviewed. Allergies: Patient has no known allergies.     -------------------------------------------------- RESULTS -------------------------------------------------    Lab  Results for orders placed or performed during the hospital encounter of 08/19/18   CBC Auto Differential   Result Value Ref Range    WBC 4.6 4.5 - 11.5 E9/L    RBC 4.33 3.50 - 5.50 E12/L    Hemoglobin 13.4 11.5 - 15.5 g/dL    Hematocrit 41.4 34.0 - 48.0 %    MCV 95.6 80.0 - 99.9 fL    MCH 30.9 26.0 - 35.0 pg    MCHC 32.4 32.0 - 34.5 %    RDW 12.7 11.5 - 15.0 fL    Platelets 641 086 - 579 E9/L    MPV 11.1 7.0 - 12.0 fL    Neutrophils % 44.4 43.0 - 80.0 %    Immature Granulocytes % 0.2 0.0 - 5.0 %    Lymphocytes % 43.7 (H) 20.0 - 42.0 %    Monocytes % 10.0 2.0 - 12.0 %    Eosinophils % 1.1 0.0 - 6.0 %    Basophils % 0.6 0.0 - 2.0 %    Neutrophils # 2.05 1.80 - 7.30 E9/L    Immature Granulocytes # 0.01 E9/L    Lymphocytes # 2.02 1.50 - 4.00 E9/L    Monocytes # 0.46 0.10 - 0.95 E9/L    Eosinophils # 0.05 0.05 - 0.50 E9/L    Basophils # 0.03 0.00 - 0.20 E9/L   Comprehensive Metabolic Panel   Result Value Ref Range    Sodium 140 132 - 146 mmol/L    Potassium 4.9 3.5 - 5.0 mmol/L    Chloride 103 98 - 107 mmol/L    CO2 28 22 - 29 mmol/L    Anion Gap 9 7 - 16 mmol/L    Glucose 96 74 - 109 mg/dL    BUN 10 8 - 23 mg/dL    CREATININE 0.8 0.5 - 1.0 mg/dL    GFR Non-African American >60 >=60 mL/min/1.73    GFR African American >60     Calcium 9.6 8.6 - 10.2 mg/dL    Total Protein 7.9 6.4 - 8.3 g/dL    Alb 3.9 3.5 - 5.2 g/dL    Total Bilirubin 0.3 0.0 - 1.2 mg/dL    Alkaline Phosphatase 71 35 - 104 U/L    ALT 13 0 - 32 U/L    AST 19 0 - 31 U/L   Lactic Acid, Plasma   Result Value Ref Range    Lactic Acid 2.1 0.5 - 2.2 mmol/L   Troponin   Result Value Ref Range    Troponin <0.01 0.00 - 0.03 ng/mL   EKG 12 Lead   Result Value Ref Range    Ventricular Rate 60 BPM    Atrial Rate 60 BPM    P-R Interval 176 ms    QRS Duration 148 ms    Q-T Interval 472 ms    QTc Calculation (Bazett) 472 ms    P Axis 55 degrees    R Axis -27 degrees    T Axis -8 degrees       Radiology  CT ABDOMEN PELVIS W WO CONTRAST Additional Contrast? None   Final Result   Addendum 1 of 1   Addendum: There is a transcriptional error within the IMPRESSION of   the report. Item #2, second sentence should read \"An underlying   ovarian malignancy is not excluded based on this exam\", not pulmonary. Findings communicated directly with Dr. Jose C Quintanilla at approximately   8/21/2018 1:33 PM , at which time we discussed this concern for   possible ovarian malignancy in the left hemipelvis solid soft tissue   density. .      Final   ALERT:  THIS IS AN ABNORMAL REPORT. Findings communicated   directly with Dr. Zoraida Guthrie at approximately 8/19/2018 10:37 AM .   1. There is moderately severe to severe diverticulosis with wall   thickening noted in the sigmoid colon. No definitive evidence of   pericolonic inflammatory stranding, abscess formation or free   intraperitoneal air is seen, this finding could reflect an early   developing diverticulitis.    2. Solid soft tissue density noted in the left hemipelvis measuring   approximately 1.2 x 1.4 cm could be reflective of a left ovary or   ovarian remnant. An underlying pulmonary malignancy is not excluded   based on this exam. Consider further evaluation with dedicated   transabdominal/transvaginal ultrasound with Doppler interrogation of   vascularity. 3. Calcified and noncalcified plaque within the abdominal aorta with   vascular calcifications thoracic aorta, mild cardiomegaly and   calcifications left anterior descending coronary artery. 4. Convex left spinal curvature centered about the L1 vertebral body   with moderate to moderately severe degenerative disc disease L3-S1   with moderately severe facet osteoarthropathy L2-S1.   5. Fat-containing umbilical hernia. 6. Left renal cyst.   7. Incomplete visualization of atelectasis and possible underlying   pulmonary edema within the lung bases. Clinical correlation   recommended. EKG: This EKG is signed and interpreted by me. Rate: 60  Rhythm: Sinus  Interpretation: Sinus rhythm, right bundle branch block  Comparison: stable as compared to patient's most recent EKG dated 8/7/13    ------------------------- NURSING NOTES AND VITALS REVIEWED ---------------------------  Date / Time Roomed:  8/19/2018  8:39 AM  ED Bed Assignment:  22/22    The nursing notes within the ED encounter and vital signs as below have been reviewed. Patient Vitals for the past 24 hrs:   BP Temp Temp src Pulse Resp SpO2 Height Weight   08/19/18 0850 (!) 212/82 98.1 °F (36.7 °C) Oral 68 14 98 % 5' 5\" (1.651 m) 194 lb (88 kg)       Oxygen Saturation Interpretation: Normal    --------------------------------------- ED Clinical Course/MDM --------------------------------------    Patient is an 72-year-old female presenting with abdominal pain. History, physical, labs and imaging reviewed. Patient was noted to have diverticulosis and sigmoid thickening without abscess formation or free air. She additionally had a soft tissue mass in the pelvis which was indicates the patient for her to follow up outpatient.  Patient's

## 2018-08-21 LAB — URINE CULTURE, ROUTINE: NORMAL

## 2019-01-15 PROBLEM — M53.2X6 LUMBAR SPINE INSTABILITY: Status: ACTIVE | Noted: 2019-01-15

## 2019-12-09 ENCOUNTER — TELEPHONE (OUTPATIENT)
Dept: ADMINISTRATIVE | Age: 84
End: 2019-12-09

## 2019-12-16 ENCOUNTER — OFFICE VISIT (OUTPATIENT)
Dept: CARDIOLOGY CLINIC | Age: 84
End: 2019-12-16
Payer: MEDICARE

## 2019-12-16 VITALS
HEIGHT: 65 IN | HEART RATE: 84 BPM | RESPIRATION RATE: 16 BRPM | WEIGHT: 186.4 LBS | BODY MASS INDEX: 31.06 KG/M2 | DIASTOLIC BLOOD PRESSURE: 78 MMHG | SYSTOLIC BLOOD PRESSURE: 124 MMHG

## 2019-12-16 DIAGNOSIS — R06.09 DYSPNEA ON EXERTION: ICD-10-CM

## 2019-12-16 DIAGNOSIS — I10 HYPERTENSION, UNSPECIFIED TYPE: Primary | ICD-10-CM

## 2019-12-16 PROCEDURE — 99204 OFFICE O/P NEW MOD 45 MIN: CPT | Performed by: INTERNAL MEDICINE

## 2019-12-16 PROCEDURE — 93000 ELECTROCARDIOGRAM COMPLETE: CPT | Performed by: INTERNAL MEDICINE

## 2019-12-16 RX ORDER — PYRANTEL PAMOATE 50 MG/ML
SUSPENSION, ORAL (FINAL DOSE FORM) ORAL
Refills: 5 | COMMUNITY
Start: 2019-11-04

## 2019-12-16 RX ORDER — SENNA AND DOCUSATE SODIUM 50; 8.6 MG/1; MG/1
1 TABLET, FILM COATED ORAL DAILY
COMMUNITY

## 2020-01-09 ENCOUNTER — TELEPHONE (OUTPATIENT)
Dept: CARDIOLOGY | Age: 85
End: 2020-01-09

## 2020-01-10 ENCOUNTER — HOSPITAL ENCOUNTER (OUTPATIENT)
Dept: CARDIOLOGY | Age: 85
Discharge: HOME | End: 2020-01-10
Payer: MEDICARE

## 2020-01-10 LAB
LV EF: 58 %
LVEF MODALITY: NORMAL

## 2020-01-10 PROCEDURE — 93306 TTE W/DOPPLER COMPLETE: CPT | Performed by: PSYCHIATRY & NEUROLOGY

## 2020-01-10 RX ORDER — SODIUM CHLORIDE 0.9 % (FLUSH) 0.9 %
10 SYRINGE (ML) INJECTION PRN
Status: DISCONTINUED | OUTPATIENT
Start: 2020-01-10 | End: 2020-01-10

## 2020-01-13 ENCOUNTER — TELEPHONE (OUTPATIENT)
Dept: CARDIOLOGY | Age: 85
End: 2020-01-13

## 2020-01-13 NOTE — TELEPHONE ENCOUNTER
Reminder Call for [address] Test, 01/15/2020 @0900. Stress importance of no caffeine 12 hours to stress test \"coffee\" and nothing to eat after 1200 min\dnight. She acknowledged understanding \"I have my instructions\".

## 2020-01-15 ENCOUNTER — HOSPITAL ENCOUNTER (OUTPATIENT)
Dept: CARDIOLOGY | Age: 85
Discharge: HOME | End: 2020-01-15
Payer: MEDICARE

## 2020-01-15 VITALS
DIASTOLIC BLOOD PRESSURE: 80 MMHG | SYSTOLIC BLOOD PRESSURE: 126 MMHG | HEART RATE: 79 BPM | HEIGHT: 65 IN | BODY MASS INDEX: 30.99 KG/M2 | WEIGHT: 186 LBS

## 2020-01-15 PROCEDURE — 78452 HT MUSCLE IMAGE SPECT MULT: CPT

## 2020-01-15 PROCEDURE — 6360000002 HC RX W HCPCS: Performed by: INTERNAL MEDICINE

## 2020-01-15 PROCEDURE — 2580000003 HC RX 258: Performed by: INTERNAL MEDICINE

## 2020-01-15 PROCEDURE — A9500 TC99M SESTAMIBI: HCPCS | Performed by: INTERNAL MEDICINE

## 2020-01-15 PROCEDURE — 93017 CV STRESS TEST TRACING ONLY: CPT

## 2020-01-15 PROCEDURE — 3430000000 HC RX DIAGNOSTIC RADIOPHARMACEUTICAL: Performed by: INTERNAL MEDICINE

## 2020-01-15 RX ORDER — SODIUM CHLORIDE 0.9 % (FLUSH) 0.9 %
10 SYRINGE (ML) INJECTION PRN
Status: DISCONTINUED | OUTPATIENT
Start: 2020-01-15 | End: 2020-01-16 | Stop reason: HOSPADM

## 2020-01-15 RX ADMIN — SODIUM CHLORIDE, PRESERVATIVE FREE 10 ML: 5 INJECTION INTRAVENOUS at 11:33

## 2020-01-15 RX ADMIN — REGADENOSON 0.4 MG: 0.08 INJECTION, SOLUTION INTRAVENOUS at 11:32

## 2020-01-15 RX ADMIN — Medication 10.8 MILLICURIE: at 08:41

## 2020-01-15 RX ADMIN — SODIUM CHLORIDE, PRESERVATIVE FREE 10 ML: 5 INJECTION INTRAVENOUS at 11:32

## 2020-01-15 RX ADMIN — Medication 30 MILLICURIE: at 11:32

## 2020-01-15 RX ADMIN — SODIUM CHLORIDE, PRESERVATIVE FREE 10 ML: 5 INJECTION INTRAVENOUS at 08:41

## 2020-01-15 NOTE — PROCEDURES
attenuation. The gated SPECT stress imaging in the short, vertical long, and horizontal long axis demonstrated     A mild defect was present in the basal inferolateral and mid inferolateral wall(s) that was  moderate sized by quantification. The resting images show no change. Gated SPECT left ventricular ejection fraction was calculated to be 58%, with normal myocardial thickening and wall motion. Impression:    1. ECG during the infusion did not change. 2. The myocardial perfusion imaging was normal with attenuation artifact. 3. Overall left ventricular systolic function was normal without regional wall motion abnormalities. 4. Low risk general pharmacologic stress test.    Thank you for sending your patient to this [employer].      Electronically signed by Camila Garg DO on 1/15/20 at 2:16 PM

## 2020-01-22 ENCOUNTER — TELEPHONE (OUTPATIENT)
Dept: CARDIOLOGY CLINIC | Age: 85
End: 2020-01-22

## 2021-06-12 ENCOUNTER — APPOINTMENT (OUTPATIENT)
Dept: CT IMAGING | Age: 86
End: 2021-06-12
Payer: MEDICARE

## 2021-06-12 ENCOUNTER — HOSPITAL ENCOUNTER (EMERGENCY)
Age: 86
Discharge: HOME | End: 2021-06-12
Payer: MEDICARE

## 2021-06-12 VITALS
TEMPERATURE: 97.2 F | BODY MASS INDEX: 30.99 KG/M2 | HEIGHT: 65 IN | WEIGHT: 186 LBS | HEART RATE: 75 BPM | RESPIRATION RATE: 14 BRPM | DIASTOLIC BLOOD PRESSURE: 101 MMHG | SYSTOLIC BLOOD PRESSURE: 138 MMHG | OXYGEN SATURATION: 99 %

## 2021-06-12 DIAGNOSIS — K57.90 DIVERTICULOSIS: ICD-10-CM

## 2021-06-12 DIAGNOSIS — R10.30 LOWER ABDOMINAL PAIN: Primary | ICD-10-CM

## 2021-06-12 LAB
ALBUMIN SERPL-MCNC: 3.9 G/DL (ref 3.5–5.2)
ALP BLD-CCNC: 84 U/L (ref 35–104)
ALT SERPL-CCNC: 11 U/L (ref 0–32)
ANION GAP SERPL CALCULATED.3IONS-SCNC: 9 MMOL/L (ref 7–16)
AST SERPL-CCNC: 18 U/L (ref 0–31)
BASOPHILS ABSOLUTE: 0.03 E9/L (ref 0–0.2)
BASOPHILS RELATIVE PERCENT: 0.7 % (ref 0–2)
BILIRUB SERPL-MCNC: 0.3 MG/DL (ref 0–1.2)
BILIRUBIN URINE: NEGATIVE
BLOOD, URINE: NEGATIVE
BUN BLDV-MCNC: 10 MG/DL (ref 6–23)
CALCIUM SERPL-MCNC: 9.7 MG/DL (ref 8.6–10.2)
CHLORIDE BLD-SCNC: 103 MMOL/L (ref 98–107)
CLARITY: CLEAR
CO2: 26 MMOL/L (ref 22–29)
COLOR: YELLOW
CREAT SERPL-MCNC: 0.8 MG/DL (ref 0.5–1)
EOSINOPHILS ABSOLUTE: 0.02 E9/L (ref 0.05–0.5)
EOSINOPHILS RELATIVE PERCENT: 0.4 % (ref 0–6)
GFR AFRICAN AMERICAN: >60
GFR NON-AFRICAN AMERICAN: >60 ML/MIN/1.73
GLUCOSE BLD-MCNC: 93 MG/DL (ref 74–99)
GLUCOSE URINE: NEGATIVE MG/DL
HCT VFR BLD CALC: 41.5 % (ref 34–48)
HEMOGLOBIN: 13.3 G/DL (ref 11.5–15.5)
IMMATURE GRANULOCYTES #: 0.01 E9/L
IMMATURE GRANULOCYTES %: 0.2 % (ref 0–5)
KETONES, URINE: NEGATIVE MG/DL
LACTIC ACID: 1.3 MMOL/L (ref 0.5–2.2)
LEUKOCYTE ESTERASE, URINE: NEGATIVE
LYMPHOCYTES ABSOLUTE: 1.77 E9/L (ref 1.5–4)
LYMPHOCYTES RELATIVE PERCENT: 38.4 % (ref 20–42)
MCH RBC QN AUTO: 30.9 PG (ref 26–35)
MCHC RBC AUTO-ENTMCNC: 32 % (ref 32–34.5)
MCV RBC AUTO: 96.5 FL (ref 80–99.9)
MONOCYTES ABSOLUTE: 0.45 E9/L (ref 0.1–0.95)
MONOCYTES RELATIVE PERCENT: 9.8 % (ref 2–12)
NEUTROPHILS ABSOLUTE: 2.33 E9/L (ref 1.8–7.3)
NEUTROPHILS RELATIVE PERCENT: 50.5 % (ref 43–80)
NITRITE, URINE: NEGATIVE
PDW BLD-RTO: 13 FL (ref 11.5–15)
PH UA: 7.5 (ref 5–9)
PLATELET # BLD: 176 E9/L (ref 130–450)
PMV BLD AUTO: 10.9 FL (ref 7–12)
POTASSIUM REFLEX MAGNESIUM: 4.8 MMOL/L (ref 3.5–5)
PROTEIN UA: NEGATIVE MG/DL
RBC # BLD: 4.3 E12/L (ref 3.5–5.5)
SODIUM BLD-SCNC: 138 MMOL/L (ref 132–146)
SPECIFIC GRAVITY UA: 1.01 (ref 1–1.03)
TOTAL PROTEIN: 7.6 G/DL (ref 6.4–8.3)
UROBILINOGEN, URINE: 0.2 E.U./DL
WBC # BLD: 4.6 E9/L (ref 4.5–11.5)

## 2021-06-12 PROCEDURE — 83605 ASSAY OF LACTIC ACID: CPT

## 2021-06-12 PROCEDURE — 6360000004 HC RX CONTRAST MEDICATION: Performed by: RADIOLOGY

## 2021-06-12 PROCEDURE — 85025 COMPLETE CBC W/AUTO DIFF WBC: CPT

## 2021-06-12 PROCEDURE — 80053 COMPREHEN METABOLIC PANEL: CPT

## 2021-06-12 PROCEDURE — 99283 EMERGENCY DEPT VISIT LOW MDM: CPT

## 2021-06-12 PROCEDURE — 81003 URINALYSIS AUTO W/O SCOPE: CPT

## 2021-06-12 PROCEDURE — 74177 CT ABD & PELVIS W/CONTRAST: CPT

## 2021-06-12 RX ORDER — POLYETHYLENE GLYCOL 3350 17 G/17G
POWDER, FOR SOLUTION ORAL
Qty: 1 BOTTLE | Refills: 0 | Status: SHIPPED | OUTPATIENT
Start: 2021-06-12 | End: 2021-06-26

## 2021-06-12 RX ORDER — 0.9 % SODIUM CHLORIDE 0.9 %
500 INTRAVENOUS SOLUTION INTRAVENOUS ONCE
Status: DISCONTINUED | OUTPATIENT
Start: 2021-06-12 | End: 2021-06-12 | Stop reason: HOSPADM

## 2021-06-12 RX ADMIN — IOPAMIDOL 75 ML: 755 INJECTION, SOLUTION INTRAVENOUS at 14:04

## 2021-06-12 NOTE — ED PROVIDER NOTES
[facility]                                                                                                                                    Department of Emergency Medicine   ED  Provider Note  Admit Date/RoomTime: 6/12/2021 10:57 AM  ED Room: 08/08        HPI:  6/12/21,   Time: 11:12 AM EDT         William Phan is a 80 y.o. female presenting to the ED for lower abdominal pain, beginning 1 month ago. The complaint has been persistent, mild in severity, and worsened by nothing. The patient has a history of diverticulitis. She arrives to the emergency room via private car with her daughter. The patient reports diffuse lower abdominal pain that she describes as a \"uncomfortable\" feeling. She states the pain is diffuse across the lower abdomen and sometimes radiates to her back. The patient denies any nausea, vomiting or diarrhea. She denies any black or bloody stools. No fever or chills reported. Pt has been eating and drinking well. ROS:     Constitutional: Negative for fever and chills  HENT: Negative for ear pain, sore throat and sinus pressure  Eyes: Negative for pain, discharge and redness  Respiratory:  Negative for shortness of breath, cough and wheezing  Cardiovascular: Negative for CP, edema or palpitations  Gastrointestinal: See HPI  Genitourinary:  See HPI  Skin: Negative for rash and wound  Neurological: Negative for weakness and headaches  Hematological: Negative for adenopathy    All other systems reviewed and are negative      -------------------------------- PAST HISTORY ----------------------------------  Past Medical History:  has a past medical history of Arthritis, Encounter for screening colonoscopy, Hyperlipidemia, Hypertension, Thyroid disease, and Thyroid nodule. Past Surgical History:  has a past surgical history that includes Hysterectomy; Thyroid surgery (2001);  Colonoscopy; Endoscopy, colon, diagnostic; and pr colonoscopy flx dx w/collj spec when pfrmd (N/A, 7/27/2018). Social History:  reports that she has never smoked. She has never used smokeless tobacco. She reports that she does not drink alcohol and does not use drugs. Family History: family history includes Kidney Disease in her mother; Other in her sister. The patients home medications have been reviewed. Allergies: Patient has no known allergies.     --------------------------------- RESULTS ------------------------------------------  All laboratory and radiology results have been personally reviewed by myself   LABS:  Results for orders placed or performed during the hospital encounter of 06/12/21   CBC Auto Differential   Result Value Ref Range    WBC 4.6 4.5 - 11.5 E9/L    RBC 4.30 3.50 - 5.50 E12/L    Hemoglobin 13.3 11.5 - 15.5 g/dL    Hematocrit 41.5 34.0 - 48.0 %    MCV 96.5 80.0 - 99.9 fL    MCH 30.9 26.0 - 35.0 pg    MCHC 32.0 32.0 - 34.5 %    RDW 13.0 11.5 - 15.0 fL    Platelets 033 886 - 540 E9/L    MPV 10.9 7.0 - 12.0 fL    Neutrophils % 50.5 43.0 - 80.0 %    Immature Granulocytes % 0.2 0.0 - 5.0 %    Lymphocytes % 38.4 20.0 - 42.0 %    Monocytes % 9.8 2.0 - 12.0 %    Eosinophils % 0.4 0.0 - 6.0 %    Basophils % 0.7 0.0 - 2.0 %    Neutrophils Absolute 2.33 1.80 - 7.30 E9/L    Immature Granulocytes # 0.01 E9/L    Lymphocytes Absolute 1.77 1.50 - 4.00 E9/L    Monocytes Absolute 0.45 0.10 - 0.95 E9/L    Eosinophils Absolute 0.02 (L) 0.05 - 0.50 E9/L    Basophils Absolute 0.03 0.00 - 0.20 E9/L   Comprehensive Metabolic Panel w/ Reflex to MG   Result Value Ref Range    Sodium 138 132 - 146 mmol/L    Potassium reflex Magnesium 4.8 3.5 - 5.0 mmol/L    Chloride 103 98 - 107 mmol/L    CO2 26 22 - 29 mmol/L    Anion Gap 9 7 - 16 mmol/L    Glucose 93 74 - 99 mg/dL    BUN 10 6 - 23 mg/dL    CREATININE 0.8 0.5 - 1.0 mg/dL    GFR Non-African American >60 >=60 mL/min/1.73    GFR African American >60     Calcium 9.7 8.6 - 10.2 mg/dL    Total Protein 7.6 6.4 - 8.3 g/dL    Albumin 3.9 3.5 - 5.2 g/dL Total Bilirubin 0.3 0.0 - 1.2 mg/dL    Alkaline Phosphatase 84 35 - 104 U/L    ALT 11 0 - 32 U/L    AST 18 0 - 31 U/L   Lactic Acid, Plasma   Result Value Ref Range    Lactic Acid 1.3 0.5 - 2.2 mmol/L   Urinalysis   Result Value Ref Range    Color, UA Yellow Straw/Yellow    Clarity, UA Clear Clear    Glucose, Ur Negative Negative mg/dL    Bilirubin Urine Negative Negative    Ketones, Urine Negative Negative mg/dL    Specific Gravity, UA 1.015 1.005 - 1.030    Blood, Urine Negative Negative    pH, UA 7.5 5.0 - 9.0    Protein, UA Negative Negative mg/dL    Urobilinogen, Urine 0.2 <2.0 E.U./dL    Nitrite, Urine Negative Negative    Leukocyte Esterase, Urine Negative Negative       RADIOLOGY:  Interpreted by Radiologist.  CT ABDOMEN PELVIS W IV CONTRAST Additional Contrast? None   Final Result   Stable thickening of the sigmoid colon with extensive diverticulosis but no   evidence of adjacent inflammatory stranding or fluid. Findings not   definitive for diverticulitis.             ----------------- NURSING NOTES AND VITALS REVIEWED ---------------   The nursing notes within the ED encounter and vital signs as below have been reviewed. BP (!) 138/101   Pulse 75   Temp 97.2 °F (36.2 °C) (Oral)   Resp 14   Ht 5' 5\" (1.651 m)   Wt 186 lb (84.4 kg)   SpO2 99%   BMI 30.95 kg/m²   Oxygen Saturation Interpretation: Normal      --------------------------------PHYSICAL EXAM------------------------------------      Constitutional/General: Alert and oriented x3, well appearing, non toxic in NAD  Head: NC/AT  Eyes: PERRL, EOMI  Mouth: Oropharynx clear, handling secretions, no trismus  Neck: Supple, full ROM, no meningeal signs  Pulmonary: Lungs clear to auscultation bilaterally, no wheezes, rales, or rhonchi. Not in respiratory distress  Cardiovascular:  Regular rate and rhythm, no murmurs, gallops, or rubs. 2+ distal pulses  Abdomen: Soft, + BS. No distension. Nontender.  No significant pain/discomfort with palpation lower abdomen. No palpable rigidity, rebound or guarding  Extremities: Moves all extremities x 4. Warm and well perfused  Skin: warm and dry without rash  Neurologic: GCS 15,  Intact. No focal deficits  Psych: Normal Affect      ------------------------ ED COURSE/MEDICAL DECISION MAKING----------------------  Medications   0.9 % sodium chloride bolus (has no administration in time range)   iopamidol (ISOVUE-370) 76 % injection 75 mL (75 mLs Intravenous Given 6/12/21 1714)         Medical Decision Making:    The patient had really no significant pain on my exam.  She looks certainly very, nontoxic. Labs and urine are clear. CAT scan with IV contrast shows some very mild thickening of the sigmoid colon with diverticulosis but no acute diverticulitis. She has no fever, no leukocytosis and no isolated pain. Again she really had no reproducible pain for me on exam.  Patient and daughter reassured. Follow-up with Dr. Cory Elder as needed if any ongoing or persistent symptoms. She does have moderate fecal retention and is advised to start taking the medications that she has at home for the same. They are aware and agreeable. Follow-up as discussed       Counseling: The emergency provider has spoken with the patient and discussed todays results, in addition to providing specific details for the plan of care and counseling regarding the diagnosis and prognosis. Questions are answered at this time and they are agreeable with the plan.      ------------------------ IMPRESSION AND DISPOSITION -------------------------------    IMPRESSION  1. Lower abdominal pain    2.  Diverticulosis        DISPOSITION  Disposition: Discharge to home  Patient condition is stable                   Bladimir Webb PA-C  06/12/21 1168

## 2022-09-22 ENCOUNTER — HOSPITAL ENCOUNTER (EMERGENCY)
Age: 87
Discharge: HOME | End: 2022-09-22
Payer: MEDICARE

## 2022-09-22 ENCOUNTER — APPOINTMENT (OUTPATIENT)
Dept: GENERAL RADIOLOGY | Age: 87
End: 2022-09-22
Payer: MEDICARE

## 2022-09-22 ENCOUNTER — APPOINTMENT (OUTPATIENT)
Dept: CT IMAGING | Age: 87
End: 2022-09-22
Payer: MEDICARE

## 2022-09-22 VITALS
HEART RATE: 83 BPM | WEIGHT: 180 LBS | HEIGHT: 65 IN | RESPIRATION RATE: 14 BRPM | DIASTOLIC BLOOD PRESSURE: 99 MMHG | SYSTOLIC BLOOD PRESSURE: 159 MMHG | TEMPERATURE: 97 F | OXYGEN SATURATION: 99 % | BODY MASS INDEX: 29.99 KG/M2

## 2022-09-22 DIAGNOSIS — S30.0XXA CONTUSION OF COCCYX, INITIAL ENCOUNTER: Primary | ICD-10-CM

## 2022-09-22 PROCEDURE — 72220 X-RAY EXAM SACRUM TAILBONE: CPT

## 2022-09-22 PROCEDURE — 73521 X-RAY EXAM HIPS BI 2 VIEWS: CPT

## 2022-09-22 PROCEDURE — 72131 CT LUMBAR SPINE W/O DYE: CPT

## 2022-09-22 PROCEDURE — 99284 EMERGENCY DEPT VISIT MOD MDM: CPT

## 2022-09-22 RX ORDER — HYDROCODONE BITARTRATE AND ACETAMINOPHEN 5; 325 MG/1; MG/1
1 TABLET ORAL EVERY 8 HOURS PRN
Qty: 9 TABLET | Refills: 0 | Status: SHIPPED | OUTPATIENT
Start: 2022-09-22 | End: 2022-09-25

## 2022-09-22 ASSESSMENT — PAIN SCALES - GENERAL
PAINLEVEL_OUTOF10: 10
PAINLEVEL_OUTOF10: 10

## 2022-09-22 ASSESSMENT — PAIN - FUNCTIONAL ASSESSMENT
PAIN_FUNCTIONAL_ASSESSMENT: 0-10
PAIN_FUNCTIONAL_ASSESSMENT: 0-10

## 2022-09-22 ASSESSMENT — PAIN DESCRIPTION - LOCATION
LOCATION: SACRUM
LOCATION: SACRUM

## 2022-09-22 NOTE — ED PROVIDER NOTES
[address] [place] [address]  Department of Emergency Medicine   ED  Encounter Note  Admit Date/RoomTime: 2022 12:49 PM  ED Room:   NAME: Leilani Lock  : 10/19/1933  MRN: [id number]     Chief Complaint:  Fall, Back Pain, and Tailbone Pain (fall onto buttocks from standing position onto hardwood floor 2 days ago, low back pain worsening)    HISTORY OF PRESENT ILLNESS        Gabriel Reeder is a 80 y.o. female who presents to the ED with a complaint of tailbone and left buttock pain. 5 days ago patient tripped on her own feet and fell backwards onto her buttock. Landed on the hardwood floor on her butt. Family did help her up. She has been ambulating since then. But keeps complaining of tailbone and left buttock pain. When she sits she leans to the right onto her right buttock. Patient denies any numbness or tingling down her legs. Denies any loss of function to her legs. Patient denies urine incontinence or retention. Denies saddle anesthesia. Denies bowel incontinence or retention. She rates the pain a 10 out of 10 to her tailbone and left buttock. Is on Ultram already for back issues and states it does help. ROS   Pertinent positives and negatives are stated within HPI, all other systems reviewed and are negative. Past Medical History:  has a past medical history of Arthritis, Encounter for screening colonoscopy, Hyperlipidemia, Hypertension, Thyroid disease, and Thyroid nodule. Surgical History:  has a past surgical history that includes Hysterectomy; Thyroid surgery (); Colonoscopy; Endoscopy, colon, diagnostic; and pr colonoscopy flx dx w/collj spec when pfrmd (N/A, 2018). Social History:  reports that she has never smoked. She has never used smokeless tobacco. She reports that she does not drink alcohol and does not use drugs. Family History: family history includes Kidney Disease in her mother; Other in her sister.      Allergies: Patient has no known allergies. PHYSICAL EXAM   Oxygen Saturation Interpretation: Normal on room air analysis. ED Triage Vitals [09/22/22 1243]   BP Temp Temp Source Heart Rate Resp SpO2 Height Weight   (!) 159/99 97 °F (36.1 °C) Temporal 79 14 100 % 5' 5\" (1.651 m) 180 lb (81.6 kg)         General:  NAD. Alert and Oriented. Well-appearing. Skin:  Warm, dry. No rashes. Head:  Normocephalic. Atraumatic. Eyes:  EOMI. Conjunctiva normal.  ENT:  Oral mucosa moist.  Airway patent. Neck:  Supple. Normal ROM. Respiratory:  No respiratory distress. No labored breathing. Lungs clear without rales, rhonchi or wheezing. Cardiovascular:  Regular rate. No Murmur. No peripheral edema. Extremities warm and good color. Extremities:  Normal ROM. Nontender to palpation. Atraumatic. Back:    Patient is tender to palpation around the tailbone and the left of the tailbone into the left buttock. There is no areas of ecchymosis. No erythema or warmth. No abscess or area of induration. She really is not tender to palpation across the low back or midline lumbar spine. Nontender to palpation to the thoracic spine. Straight leg raise is negative bilateral.  Patient has good sensation to light touch down both of her legs. Neuro:  Alert and Oriented to person, place, time and situation. Normal LOC. Moves all extremities. Speech fluent. Psych:  Calm and Cooperative. Normal thought process. Normal judgement. Lab / Imaging Results   (All laboratory and radiology results have been personally reviewed by myself)  Labs:  No results found for this visit on 09/22/22. Imaging: All Radiology results interpreted by Radiologist unless otherwise noted. XR SACRUM COCCYX (MIN 2 VIEWS)   Final Result   No findings to suggest acute fracture involving sacrum or coccyx. CT Lumbar Spine WO Contrast   Final Result   1. Inferior aspect of the sacrum and coccyx not included on this examination.    No evidence of fracture at level of S1, S2, or visualized S3.  Repeat   examination with additional imaging to include lower sacrum and coccyx may be   warranted. 2. No acute abnormality involving the lumbar spine. 3. Degenerative changes as described above. XR HIP BILATERAL W AP PELVIS (2 VIEWS)   Final Result   1. No fracture or dislocation involving pelvis or bilateral hips. 2. Osteoarthritis involving bilateral hips. ED Course / Medical Decision Making   Medications - No data to display     Re-examination:  9/22/22      Time:   Patients condition . Consult(s):   None    Procedure(s):   None    MDM:   All results discussed with patient and family at bedside. Thankfully nothing is broken. Does have significant spinal arthritis. Decisions been made that I will write for 2 days worth of Norco.  She will not take the Ultram while on the stronger pain medication. Patient does live with multiple family members and they will keep an eye on her to make sure the medicine is not too strong. I did also recommend like a little blowup doughnut for cushion when she sits. Plan of Care/Counseling:  Vladimir Rodney, [address] reviewed today's visit with the patient and daughter in addition to providing specific details for the plan of care and counseling regarding the diagnosis and prognosis. Questions are answered at this time and are agreeable with the plan. ASSESSMENT     1. Contusion of coccyx, initial encounter New Problem     PLAN   Discharged home. Patient condition is good    New Medications     New Prescriptions    HYDROCODONE-ACETAMINOPHEN (NORCO) 5-325 MG PER TABLET    Take 1 tablet by mouth every 8 hours as needed for Pain for up to 3 days. Electronically signed by ALVARADO Franco   DD: 9/22/22  **This report was transcribed using voice recognition software. Every effort was made to ensure accuracy; however, inadvertent computerized transcription errors may be present.   END OF ED PROVIDER NOTE       Andre Seymour [place]  09/22/22 [id number]

## 2024-07-11 ENCOUNTER — ANESTHESIA EVENT (OUTPATIENT)
Age: 89
DRG: 243 | End: 2024-07-11
Payer: MEDICARE

## 2024-07-11 ENCOUNTER — APPOINTMENT (OUTPATIENT)
Dept: GENERAL RADIOLOGY | Age: 89
DRG: 243 | End: 2024-07-11
Payer: MEDICARE

## 2024-07-11 ENCOUNTER — ANESTHESIA (OUTPATIENT)
Age: 89
DRG: 243 | End: 2024-07-11
Payer: MEDICARE

## 2024-07-11 ENCOUNTER — HOSPITAL ENCOUNTER (INPATIENT)
Age: 89
LOS: 1 days | Discharge: HOME | DRG: 243 | End: 2024-07-12
Attending: EMERGENCY MEDICINE | Admitting: INTERNAL MEDICINE
Payer: MEDICARE

## 2024-07-11 ENCOUNTER — APPOINTMENT (OUTPATIENT)
Age: 89
DRG: 243 | End: 2024-07-11
Payer: MEDICARE

## 2024-07-11 DIAGNOSIS — E78.5 HYPERLIPIDEMIA, UNSPECIFIED HYPERLIPIDEMIA TYPE: ICD-10-CM

## 2024-07-11 DIAGNOSIS — I44.2 COMPLETE HEART BLOCK (HCC): ICD-10-CM

## 2024-07-11 DIAGNOSIS — I10 ESSENTIAL HYPERTENSION: ICD-10-CM

## 2024-07-11 DIAGNOSIS — I44.2 COMPLETE AV BLOCK (HCC): Primary | ICD-10-CM

## 2024-07-11 DIAGNOSIS — I48.3 TYPICAL ATRIAL FLUTTER (HCC): ICD-10-CM

## 2024-07-11 DIAGNOSIS — R00.1 BRADYCARDIA: ICD-10-CM

## 2024-07-11 LAB
ALBUMIN SERPL-MCNC: 3.4 G/DL (ref 3.5–5.2)
ALBUMIN SERPL-MCNC: NORMAL G/DL (ref 3.5–5.2)
ALBUMIN/GLOB SERPL: NORMAL {RATIO} (ref 1–2.5)
ALP SERPL-CCNC: 88 U/L (ref 35–104)
ALP SERPL-CCNC: NORMAL U/L (ref 35–104)
ALT SERPL-CCNC: 22 U/L (ref 0–32)
ALT SERPL-CCNC: NORMAL U/L (ref 5–33)
ANION GAP SERPL CALCULATED.3IONS-SCNC: 8 MMOL/L (ref 7–16)
ANION GAP SERPL CALCULATED.3IONS-SCNC: NORMAL MMOL/L (ref 9–17)
AST SERPL-CCNC: 16 U/L (ref 0–31)
AST SERPL-CCNC: NORMAL U/L
BASOPHILS # BLD: 0.03 K/UL (ref 0–0.2)
BASOPHILS NFR BLD: 1 % (ref 0–2)
BILIRUB SERPL-MCNC: 0.4 MG/DL (ref 0–1.2)
BILIRUB SERPL-MCNC: NORMAL MG/DL (ref 0.3–1.2)
BNP INTERPRETATION: NORMAL
BNP SERPL-MCNC: 816 PG/ML (ref 0–450)
BNP SERPL-MCNC: NORMAL PG/ML
BUN SERPL-MCNC: 16 MG/DL (ref 6–23)
BUN SERPL-MCNC: NORMAL MG/DL (ref 8–23)
BUN/CREAT SERPL: NORMAL (ref 9–20)
CALCIUM SERPL-MCNC: 9.2 MG/DL (ref 8.6–10.2)
CALCIUM SERPL-MCNC: NORMAL MG/DL (ref 8.6–10.4)
CHLORIDE SERPL-SCNC: 108 MMOL/L (ref 98–107)
CHLORIDE SERPL-SCNC: NORMAL MMOL/L (ref 98–107)
CK SERPL-CCNC: 73 U/L (ref 20–180)
CO2 SERPL-SCNC: 25 MMOL/L (ref 22–29)
CO2 SERPL-SCNC: NORMAL MMOL/L (ref 20–31)
CREAT SERPL-MCNC: 1.1 MG/DL (ref 0.5–1)
CREAT SERPL-MCNC: NORMAL MG/DL (ref 0.5–0.9)
ECHO AO ASC DIAM: 3.1 CM
ECHO AO ASCENDING AORTA INDEX: 1.64 CM/M2
ECHO AR MAX VEL PISA: 3.7 M/S
ECHO AV AREA PEAK VELOCITY: 2.7 CM2
ECHO AV AREA VTI: 2.4 CM2
ECHO AV AREA/BSA PEAK VELOCITY: 1.4 CM2/M2
ECHO AV AREA/BSA VTI: 1.3 CM2/M2
ECHO AV CUSP MM: 2 CM
ECHO AV MEAN GRADIENT: 7 MMHG
ECHO AV MEAN VELOCITY: 1.2 M/S
ECHO AV PEAK GRADIENT: 14 MMHG
ECHO AV PEAK VELOCITY: 1.9 M/S
ECHO AV REGURGITANT PHT: 655.6 MILLISECOND
ECHO AV VELOCITY RATIO: 0.79
ECHO AV VTI: 39.8 CM
ECHO BSA: 1.93 M2
ECHO BSA: 1.93 M2
ECHO EST RA PRESSURE: 8 MMHG
ECHO LA DIAMETER INDEX: 2.06 CM/M2
ECHO LA DIAMETER: 3.9 CM
ECHO LA VOL A-L A2C: 62 ML (ref 22–52)
ECHO LA VOL A-L A4C: 59 ML (ref 22–52)
ECHO LA VOL MOD A2C: 60 ML (ref 22–52)
ECHO LA VOL MOD A4C: 55 ML (ref 22–52)
ECHO LA VOLUME AREA LENGTH: 61 ML
ECHO LA VOLUME INDEX A-L A2C: 33 ML/M2 (ref 16–34)
ECHO LA VOLUME INDEX A-L A4C: 31 ML/M2 (ref 16–34)
ECHO LA VOLUME INDEX AREA LENGTH: 32 ML/M2 (ref 16–34)
ECHO LA VOLUME INDEX MOD A2C: 32 ML/M2 (ref 16–34)
ECHO LA VOLUME INDEX MOD A4C: 29 ML/M2 (ref 16–34)
ECHO LV FRACTIONAL SHORTENING: 33 % (ref 28–44)
ECHO LV INTERNAL DIMENSION DIASTOLE INDEX: 2.43 CM/M2
ECHO LV INTERNAL DIMENSION DIASTOLIC: 4.6 CM (ref 3.9–5.3)
ECHO LV INTERNAL DIMENSION SYSTOLIC INDEX: 1.64 CM/M2
ECHO LV INTERNAL DIMENSION SYSTOLIC: 3.1 CM
ECHO LV ISOVOLUMETRIC RELAXATION TIME (IVRT): 50.8 MS
ECHO LV IVSD: 1.2 CM (ref 0.6–0.9)
ECHO LV IVSS: 1.4 CM
ECHO LV MASS 2D: 181.2 G (ref 67–162)
ECHO LV MASS INDEX 2D: 95.9 G/M2 (ref 43–95)
ECHO LV POSTERIOR WALL DIASTOLIC: 1 CM (ref 0.6–0.9)
ECHO LV POSTERIOR WALL SYSTOLIC: 1.7 CM
ECHO LV RELATIVE WALL THICKNESS RATIO: 0.43
ECHO LVOT AREA: 3.1 CM2
ECHO LVOT AV VTI INDEX: 0.74
ECHO LVOT DIAM: 2 CM
ECHO LVOT MEAN GRADIENT: 3 MMHG
ECHO LVOT PEAK GRADIENT: 9 MMHG
ECHO LVOT PEAK VELOCITY: 1.5 M/S
ECHO LVOT STROKE VOLUME INDEX: 48.8 ML/M2
ECHO LVOT SV: 92.3 ML
ECHO LVOT VTI: 29.4 CM
ECHO MV "A" WAVE DURATION: 143 MSEC
ECHO MV A VELOCITY: 1.95 M/S
ECHO MV AREA PHT: 2.9 CM2
ECHO MV AREA VTI: 1.6 CM2
ECHO MV E DECELERATION TIME (DT): 157 MS
ECHO MV E VELOCITY: 1.86 M/S
ECHO MV E/A RATIO: 0.95
ECHO MV LVOT VTI INDEX: 2.01
ECHO MV MAX VELOCITY: 2.3 M/S
ECHO MV MEAN GRADIENT: 5 MMHG
ECHO MV MEAN VELOCITY: 1 M/S
ECHO MV PEAK GRADIENT: 21 MMHG
ECHO MV PRESSURE HALF TIME (PHT): 75.8 MS
ECHO MV VTI: 59.2 CM
ECHO PV MAX VELOCITY: 0.8 M/S
ECHO PV MEAN GRADIENT: 1 MMHG
ECHO PV MEAN VELOCITY: 0.5 M/S
ECHO PV PEAK GRADIENT: 2 MMHG
ECHO PV VTI: 12.9 CM
ECHO PVEIN A DURATION: 179.9 MS
ECHO PVEIN A VELOCITY: 0.3 M/S
ECHO PVEIN PEAK D VELOCITY: 1 M/S
ECHO PVEIN PEAK S VELOCITY: 1 M/S
ECHO PVEIN S/D RATIO: 1
ECHO RIGHT VENTRICULAR SYSTOLIC PRESSURE (RVSP): 72 MMHG
ECHO RV INTERNAL DIMENSION: 2.8 CM
ECHO RV TAPSE: 3.5 CM (ref 1.7–?)
ECHO TV REGURGITANT MAX VELOCITY: 4.01 M/S
ECHO TV REGURGITANT PEAK GRADIENT: 64 MMHG
EOSINOPHIL # BLD: 0.05 K/UL (ref 0.05–0.5)
EOSINOPHILS RELATIVE PERCENT: 1 % (ref 0–6)
ERYTHROCYTE [DISTWIDTH] IN BLOOD BY AUTOMATED COUNT: 13.5 % (ref 11.5–15)
GFR, ESTIMATED: 46 ML/MIN/1.73M2
GFR, ESTIMATED: NORMAL ML/MIN/1.73M2
GLUCOSE SERPL-MCNC: 96 MG/DL (ref 74–99)
GLUCOSE SERPL-MCNC: NORMAL MG/DL (ref 70–99)
HCT VFR BLD AUTO: 37.7 % (ref 34–48)
HGB BLD-MCNC: 12.3 G/DL (ref 11.5–15.5)
IMM GRANULOCYTES # BLD AUTO: <0.03 K/UL (ref 0–0.58)
IMM GRANULOCYTES NFR BLD: 0 % (ref 0–5)
LYMPHOCYTES NFR BLD: 1.91 K/UL (ref 1.5–4)
LYMPHOCYTES RELATIVE PERCENT: 36 % (ref 20–42)
MAGNESIUM SERPL-MCNC: 2.4 MG/DL (ref 1.6–2.6)
MAGNESIUM SERPL-MCNC: NORMAL MG/DL (ref 1.6–2.6)
MCH RBC QN AUTO: 31.5 PG (ref 26–35)
MCHC RBC AUTO-ENTMCNC: 32.6 G/DL (ref 32–34.5)
MCV RBC AUTO: 96.4 FL (ref 80–99.9)
MONOCYTES NFR BLD: 0.47 K/UL (ref 0.1–0.95)
MONOCYTES NFR BLD: 9 % (ref 2–12)
NEUTROPHILS NFR BLD: 54 % (ref 43–80)
NEUTS SEG NFR BLD: 2.88 K/UL (ref 1.8–7.3)
PLATELET # BLD AUTO: 145 K/UL (ref 130–450)
PMV BLD AUTO: 12.3 FL (ref 7–12)
POTASSIUM SERPL-SCNC: 5 MMOL/L (ref 3.5–5)
POTASSIUM SERPL-SCNC: NORMAL MMOL/L (ref 3.7–5.3)
PROT SERPL-MCNC: 6.9 G/DL (ref 6.4–8.3)
PROT SERPL-MCNC: NORMAL G/DL (ref 6.4–8.3)
RBC # BLD AUTO: 3.91 M/UL (ref 3.5–5.5)
SODIUM SERPL-SCNC: 141 MMOL/L (ref 132–146)
SODIUM SERPL-SCNC: NORMAL MMOL/L (ref 135–144)
T4 FREE SERPL-MCNC: 1.3 NG/DL (ref 0.9–1.7)
TROPONIN I SERPL HS-MCNC: 38 NG/L (ref 0–9)
TROPONIN I SERPL HS-MCNC: 59 NG/L (ref 0–9)
TROPONIN I SERPL HS-MCNC: NORMAL NG/L (ref 0–14)
TROPONIN INTERP: NORMAL
TROPONIN T SERPL-MCNC: NORMAL NG/ML
TSH SERPL DL<=0.05 MIU/L-ACNC: 1.43 UIU/ML (ref 0.27–4.2)
WBC OTHER # BLD: 5.4 K/UL (ref 4.5–11.5)

## 2024-07-11 PROCEDURE — 93005 ELECTROCARDIOGRAM TRACING: CPT | Performed by: EMERGENCY MEDICINE

## 2024-07-11 PROCEDURE — 02HK3JZ INSERTION OF PACEMAKER LEAD INTO RIGHT VENTRICLE, PERCUTANEOUS APPROACH: ICD-10-PCS | Performed by: INTERNAL MEDICINE

## 2024-07-11 PROCEDURE — 93306 TTE W/DOPPLER COMPLETE: CPT | Performed by: INTERNAL MEDICINE

## 2024-07-11 PROCEDURE — 71045 X-RAY EXAM CHEST 1 VIEW: CPT

## 2024-07-11 PROCEDURE — C1892 INTRO/SHEATH,FIXED,PEEL-AWAY: HCPCS | Performed by: INTERNAL MEDICINE

## 2024-07-11 PROCEDURE — 93005 ELECTROCARDIOGRAM TRACING: CPT | Performed by: INTERNAL MEDICINE

## 2024-07-11 PROCEDURE — 2709999900 HC NON-CHARGEABLE SUPPLY: Performed by: INTERNAL MEDICINE

## 2024-07-11 PROCEDURE — 80053 COMPREHEN METABOLIC PANEL: CPT

## 2024-07-11 PROCEDURE — 33208 INSRT HEART PM ATRIAL & VENT: CPT | Performed by: INTERNAL MEDICINE

## 2024-07-11 PROCEDURE — C1889 IMPLANT/INSERT DEVICE, NOC: HCPCS | Performed by: INTERNAL MEDICINE

## 2024-07-11 PROCEDURE — 6360000002 HC RX W HCPCS: Performed by: NURSE PRACTITIONER

## 2024-07-11 PROCEDURE — 6360000002 HC RX W HCPCS: Performed by: INTERNAL MEDICINE

## 2024-07-11 PROCEDURE — 84484 ASSAY OF TROPONIN QUANT: CPT

## 2024-07-11 PROCEDURE — 84439 ASSAY OF FREE THYROXINE: CPT

## 2024-07-11 PROCEDURE — 6370000000 HC RX 637 (ALT 250 FOR IP): Performed by: NURSE PRACTITIONER

## 2024-07-11 PROCEDURE — C1785 PMKR, DUAL, RATE-RESP: HCPCS | Performed by: INTERNAL MEDICINE

## 2024-07-11 PROCEDURE — 6370000000 HC RX 637 (ALT 250 FOR IP): Performed by: INTERNAL MEDICINE

## 2024-07-11 PROCEDURE — 2500000003 HC RX 250 WO HCPCS: Performed by: EMERGENCY MEDICINE

## 2024-07-11 PROCEDURE — 82550 ASSAY OF CK (CPK): CPT

## 2024-07-11 PROCEDURE — 2140000000 HC CCU INTERMEDIATE R&B

## 2024-07-11 PROCEDURE — 83880 ASSAY OF NATRIURETIC PEPTIDE: CPT

## 2024-07-11 PROCEDURE — 3E0132A INTRODUCTION OF ANTI-INFECTIVE ENVELOPE INTO SUBCUTANEOUS TISSUE, PERCUTANEOUS APPROACH: ICD-10-PCS | Performed by: INTERNAL MEDICINE

## 2024-07-11 PROCEDURE — 84443 ASSAY THYROID STIM HORMONE: CPT

## 2024-07-11 PROCEDURE — 0JH606Z INSERTION OF PACEMAKER, DUAL CHAMBER INTO CHEST SUBCUTANEOUS TISSUE AND FASCIA, OPEN APPROACH: ICD-10-PCS | Performed by: INTERNAL MEDICINE

## 2024-07-11 PROCEDURE — 2580000003 HC RX 258: Performed by: INTERNAL MEDICINE

## 2024-07-11 PROCEDURE — C1898 LEAD, PMKR, OTHER THAN TRANS: HCPCS | Performed by: INTERNAL MEDICINE

## 2024-07-11 PROCEDURE — 7100000010 HC PHASE II RECOVERY - FIRST 15 MIN: Performed by: INTERNAL MEDICINE

## 2024-07-11 PROCEDURE — 3700000001 HC ADD 15 MINUTES (ANESTHESIA): Performed by: INTERNAL MEDICINE

## 2024-07-11 PROCEDURE — 93306 TTE W/DOPPLER COMPLETE: CPT

## 2024-07-11 PROCEDURE — 3700000000 HC ANESTHESIA ATTENDED CARE: Performed by: INTERNAL MEDICINE

## 2024-07-11 PROCEDURE — 7100000011 HC PHASE II RECOVERY - ADDTL 15 MIN: Performed by: INTERNAL MEDICINE

## 2024-07-11 PROCEDURE — 6360000004 HC RX CONTRAST MEDICATION: Performed by: INTERNAL MEDICINE

## 2024-07-11 PROCEDURE — 2580000003 HC RX 258: Performed by: EMERGENCY MEDICINE

## 2024-07-11 PROCEDURE — 99285 EMERGENCY DEPT VISIT HI MDM: CPT

## 2024-07-11 PROCEDURE — 83735 ASSAY OF MAGNESIUM: CPT

## 2024-07-11 PROCEDURE — 6360000002 HC RX W HCPCS

## 2024-07-11 PROCEDURE — 2500000003 HC RX 250 WO HCPCS: Performed by: INTERNAL MEDICINE

## 2024-07-11 PROCEDURE — 2720000010 HC SURG SUPPLY STERILE: Performed by: INTERNAL MEDICINE

## 2024-07-11 PROCEDURE — 2580000003 HC RX 258

## 2024-07-11 PROCEDURE — 85025 COMPLETE CBC W/AUTO DIFF WBC: CPT

## 2024-07-11 PROCEDURE — 02H63JZ INSERTION OF PACEMAKER LEAD INTO RIGHT ATRIUM, PERCUTANEOUS APPROACH: ICD-10-PCS | Performed by: INTERNAL MEDICINE

## 2024-07-11 PROCEDURE — 99291 CRITICAL CARE FIRST HOUR: CPT | Performed by: INTERNAL MEDICINE

## 2024-07-11 DEVICE — PACING LEAD
Type: IMPLANTABLE DEVICE | Site: CHEST | Status: FUNCTIONAL
Brand: TENDRIL™

## 2024-07-11 DEVICE — DR PACEMAKER DDDR
Type: IMPLANTABLE DEVICE | Site: CHEST | Status: FUNCTIONAL
Brand: ASSURITY MRI™

## 2024-07-11 DEVICE — ENVELOPE CMRM6122 ABSORB MED MR
Type: IMPLANTABLE DEVICE | Site: CHEST | Status: FUNCTIONAL
Brand: TYRX™

## 2024-07-11 RX ORDER — CEFAZOLIN SODIUM 1 G/3ML
INJECTION, POWDER, FOR SOLUTION INTRAMUSCULAR; INTRAVENOUS PRN
Status: DISCONTINUED | OUTPATIENT
Start: 2024-07-11 | End: 2024-07-11 | Stop reason: SDUPTHER

## 2024-07-11 RX ORDER — SODIUM CHLORIDE 9 MG/ML
INJECTION, SOLUTION INTRAVENOUS CONTINUOUS PRN
Status: DISCONTINUED | OUTPATIENT
Start: 2024-07-11 | End: 2024-07-11 | Stop reason: SDUPTHER

## 2024-07-11 RX ORDER — SODIUM CHLORIDE 0.9 % (FLUSH) 0.9 %
5-40 SYRINGE (ML) INJECTION EVERY 12 HOURS SCHEDULED
Status: DISCONTINUED | OUTPATIENT
Start: 2024-07-11 | End: 2024-07-12 | Stop reason: HOSPADM

## 2024-07-11 RX ORDER — SODIUM CHLORIDE 9 MG/ML
INJECTION, SOLUTION INTRAVENOUS PRN
Status: DISCONTINUED | OUTPATIENT
Start: 2024-07-11 | End: 2024-07-12 | Stop reason: HOSPADM

## 2024-07-11 RX ORDER — LANOLIN ALCOHOL/MO/W.PET/CERES
3 CREAM (GRAM) TOPICAL NIGHTLY PRN
Status: DISCONTINUED | OUTPATIENT
Start: 2024-07-11 | End: 2024-07-12 | Stop reason: HOSPADM

## 2024-07-11 RX ORDER — FENTANYL CITRATE 50 UG/ML
INJECTION, SOLUTION INTRAMUSCULAR; INTRAVENOUS PRN
Status: DISCONTINUED | OUTPATIENT
Start: 2024-07-11 | End: 2024-07-11 | Stop reason: SDUPTHER

## 2024-07-11 RX ORDER — TRAMADOL HYDROCHLORIDE 50 MG/1
50 TABLET ORAL EVERY 6 HOURS PRN
Status: DISCONTINUED | OUTPATIENT
Start: 2024-07-11 | End: 2024-07-12 | Stop reason: HOSPADM

## 2024-07-11 RX ORDER — ATENOLOL 25 MG/1
25 TABLET ORAL DAILY
COMMUNITY

## 2024-07-11 RX ORDER — ACETAMINOPHEN 325 MG/1
650 TABLET ORAL EVERY 4 HOURS PRN
Status: DISCONTINUED | OUTPATIENT
Start: 2024-07-11 | End: 2024-07-12 | Stop reason: HOSPADM

## 2024-07-11 RX ORDER — SODIUM CHLORIDE 0.9 % (FLUSH) 0.9 %
5-40 SYRINGE (ML) INJECTION PRN
Status: DISCONTINUED | OUTPATIENT
Start: 2024-07-11 | End: 2024-07-12 | Stop reason: HOSPADM

## 2024-07-11 RX ORDER — ATENOLOL 50 MG/1
50 TABLET ORAL DAILY
Status: DISCONTINUED | OUTPATIENT
Start: 2024-07-11 | End: 2024-07-12 | Stop reason: HOSPADM

## 2024-07-11 RX ORDER — DOCUSATE SODIUM 100 MG/1
100 CAPSULE, LIQUID FILLED ORAL 2 TIMES DAILY
Status: DISCONTINUED | OUTPATIENT
Start: 2024-07-11 | End: 2024-07-12 | Stop reason: HOSPADM

## 2024-07-11 RX ORDER — PROCHLORPERAZINE EDISYLATE 5 MG/ML
5 INJECTION INTRAMUSCULAR; INTRAVENOUS EVERY 6 HOURS PRN
Status: DISCONTINUED | OUTPATIENT
Start: 2024-07-11 | End: 2024-07-12 | Stop reason: HOSPADM

## 2024-07-11 RX ORDER — ATORVASTATIN CALCIUM 20 MG/1
10 TABLET, FILM COATED ORAL NIGHTLY
Status: DISCONTINUED | OUTPATIENT
Start: 2024-07-11 | End: 2024-07-12 | Stop reason: HOSPADM

## 2024-07-11 RX ORDER — HYDRALAZINE HYDROCHLORIDE 20 MG/ML
10 INJECTION INTRAMUSCULAR; INTRAVENOUS EVERY 6 HOURS PRN
Status: DISCONTINUED | OUTPATIENT
Start: 2024-07-11 | End: 2024-07-12 | Stop reason: HOSPADM

## 2024-07-11 RX ADMIN — ATORVASTATIN CALCIUM 10 MG: 20 TABLET, FILM COATED ORAL at 20:18

## 2024-07-11 RX ADMIN — SODIUM CHLORIDE, PRESERVATIVE FREE 10 ML: 5 INJECTION INTRAVENOUS at 20:17

## 2024-07-11 RX ADMIN — FENTANYL CITRATE 50 MCG: 50 INJECTION, SOLUTION INTRAMUSCULAR; INTRAVENOUS at 16:27

## 2024-07-11 RX ADMIN — VANCOMYCIN HYDROCHLORIDE 1000 MG: 1 INJECTION, POWDER, LYOPHILIZED, FOR SOLUTION INTRAVENOUS at 16:26

## 2024-07-11 RX ADMIN — HYDRALAZINE HYDROCHLORIDE 10 MG: 20 INJECTION INTRAMUSCULAR; INTRAVENOUS at 20:18

## 2024-07-11 RX ADMIN — SODIUM CHLORIDE: 9 INJECTION, SOLUTION INTRAVENOUS at 16:16

## 2024-07-11 RX ADMIN — CEFAZOLIN SODIUM 2 G: 1 INJECTION, POWDER, FOR SOLUTION INTRAMUSCULAR; INTRAVENOUS at 16:27

## 2024-07-11 RX ADMIN — FENTANYL CITRATE 25 MCG: 50 INJECTION, SOLUTION INTRAMUSCULAR; INTRAVENOUS at 16:32

## 2024-07-11 RX ADMIN — Medication 3 MG: at 20:30

## 2024-07-11 RX ADMIN — FENTANYL CITRATE 25 MCG: 50 INJECTION, SOLUTION INTRAMUSCULAR; INTRAVENOUS at 16:45

## 2024-07-11 RX ADMIN — PROCHLORPERAZINE EDISYLATE 5 MG: 5 INJECTION INTRAMUSCULAR; INTRAVENOUS at 22:35

## 2024-07-11 RX ADMIN — ISOPROTERENOL HYDROCHLORIDE 1 MCG/MIN: 0.2 INJECTION, SOLUTION INTRACARDIAC; INTRAMUSCULAR; INTRAVENOUS; SUBCUTANEOUS at 10:59

## 2024-07-11 RX ADMIN — DOCUSATE SODIUM 100 MG: 100 CAPSULE, LIQUID FILLED ORAL at 20:18

## 2024-07-11 RX ADMIN — ATENOLOL 50 MG: 50 TABLET ORAL at 20:30

## 2024-07-11 RX ADMIN — TRAMADOL HYDROCHLORIDE 50 MG: 50 TABLET ORAL at 21:25

## 2024-07-11 ASSESSMENT — PAIN - FUNCTIONAL ASSESSMENT: PAIN_FUNCTIONAL_ASSESSMENT: NONE - DENIES PAIN

## 2024-07-11 ASSESSMENT — PAIN DESCRIPTION - ORIENTATION: ORIENTATION: LEFT;UPPER

## 2024-07-11 ASSESSMENT — PAIN SCALES - GENERAL
PAINLEVEL_OUTOF10: 0
PAINLEVEL_OUTOF10: 0
PAINLEVEL_OUTOF10: 1
PAINLEVEL_OUTOF10: 7

## 2024-07-11 ASSESSMENT — PAIN DESCRIPTION - DESCRIPTORS: DESCRIPTORS: ACHING;DISCOMFORT;DULL

## 2024-07-11 ASSESSMENT — PAIN DESCRIPTION - LOCATION: LOCATION: CHEST;INCISION

## 2024-07-11 NOTE — ANESTHESIA PRE PROCEDURE
Department of Anesthesiology  Preprocedure Note       Name:  Luzmaria Montes   Age:  89 y.o.  :  10/19/1933                                          MRN:  [id number]         Date:  2024      Surgeon: Surgeon(s):  Ingrid Bey MD    Procedure: Procedure(s):  Insert PPM dual    Medications prior to admission:   Prior to Admission medications    Not on File       Current medications:    Current Facility-Administered Medications   Medication Dose Route Frequency Provider Last Rate Last Admin    isoproterenol (ISUPREL) 1 mg in sodium chloride 0.9 % 250 mL infusion  1-10 mcg/min IntraVENous Continuous Leyla-Lili Gibson MD 52.5 mL/hr at 24 1448 3.5 mcg/min at 24 1448     No current outpatient medications on file.       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    Complete heart block (HCC) I44.2       Past Medical History:        Diagnosis Date    Arthritis     Encounter for screening colonoscopy     Hyperlipidemia     Hypertension     Thyroid disease     Thyroid nodule        Past Surgical History:        Procedure Laterality Date    COLONOSCOPY      ENDOSCOPY, COLON, DIAGNOSTIC      HYSTERECTOMY (CERVIX STATUS UNKNOWN)      ID COLONOSCOPY FLX DX W/COLLJ SPEC WHEN PFRMD N/A 2018    COLONOSCOPY DIAGNOSTIC performed by Nael Tena MD at [facility] ENDOSCOPY    THYROID SURGERY  2001    nodule removed       Social History:    Social History     Tobacco Use    Smoking status: Never    Smokeless tobacco: Never   Substance Use Topics    Alcohol use: No                                Counseling given: Not Answered      Vital Signs (Current):   Vitals:    24 1415 24 1430 24 1445 24 1530   BP: (!) 170/61 (!) 164/56 (!) 178/59 (!) 201/62   Pulse: 52 56 54 57   Resp: 27 17 23 20   Temp:    37 °C (98.6

## 2024-07-11 NOTE — ED NOTES
Handoff report received from PALMIRA Telles. Pt and family updated on plan of care. Pt on cardiac monitor and connected to cardiac leads on crash cart for bradycardia/ heart block

## 2024-07-11 NOTE — ED PROVIDER NOTES
HPI:  7/11/24, Time: 7:35 AM EDT         Luzmaria Montes is a 89 y.o. female presenting to the ED for nausea and dizziness beginning about 2 weeks ago.  Patient presents from home where she lives alone.  She denies chest pain or shortness of breath.  Also noted to have pitting edema to her lower extremities which is new.  She denies any history of CHF.  She denies history of PE/DVT.  She denies syncope, abdominal pain, emesis, diarrhea, and focal numbness or weakness.  She is not anticoagulated.    --------------------------------------------- PAST HISTORY ---------------------------------------------  Past Medical History:  has a past medical history of Arthritis, Encounter for screening colonoscopy, Hyperlipidemia, Hypertension, Thyroid disease, and Thyroid nodule.    Past Surgical History:  has a past surgical history that includes Hysterectomy; Thyroid surgery (2001); Colonoscopy; Endoscopy, colon, diagnostic; and pr colonoscopy flx dx w/collj spec when pfrmd (N/A, 7/27/2018).    Social History:  reports that she has never smoked. She has never used smokeless tobacco. She reports that she does not drink alcohol and does not use drugs.    Family History: family history includes Kidney Disease in her mother; Other in her sister.     The patient’s home medications have been reviewed.    Allergies: Patient has no known allergies.    -------------------------------------------------- RESULTS -------------------------------------------------  All laboratory and radiology results have been personally reviewed by myself   LABS:  Results for orders placed or performed during the hospital encounter of 07/11/24   Magnesium   Result Value Ref Range    Magnesium Unable to perform testing: Specimen hemolyzed. 1.6 - 2.6 mg/dL   CBC with Auto Differential   Result Value Ref Range    WBC 5.4 4.5 - 11.5 k/uL    RBC 3.91 3.50 - 5.50 m/uL    Hemoglobin 12.3 11.5 - 15.5 g/dL    Hematocrit 37.7 34.0 - 48.0 %    MCV 96.4 80.0 - 99.9 fL     MCH 31.5 26.0 - 35.0 pg    MCHC 32.6 32.0 - 34.5 g/dL    RDW 13.5 11.5 - 15.0 %    Platelets 145 130 - 450 k/uL    MPV 12.3 (H) 7.0 - 12.0 fL    Neutrophils % 54 43.0 - 80.0 %    Lymphocytes % 36 20.0 - 42.0 %    Monocytes % 9 2.0 - 12.0 %    Eosinophils % 1 0 - 6 %    Basophils % 1 0.0 - 2.0 %    Immature Granulocytes % 0 0.0 - 5.0 %    Neutrophils Absolute 2.88 1.80 - 7.30 k/uL    Lymphocytes Absolute 1.91 1.50 - 4.00 k/uL    Monocytes Absolute 0.47 0.10 - 0.95 k/uL    Eosinophils Absolute 0.05 0.05 - 0.50 k/uL    Basophils Absolute 0.03 0.00 - 0.20 k/uL    Immature Granulocytes Absolute <0.03 0.00 - 0.58 k/uL   CMP   Result Value Ref Range    Sodium Unable to perform testing: Specimen hemolyzed. 135 - 144 mmol/L    Potassium Unable to perform testing: Specimen hemolyzed. 3.7 - 5.3 mmol/L    Chloride Unable to perform testing: Specimen hemolyzed. 98 - 107 mmol/L    CO2 Unable to perform testing: Specimen hemolyzed. 20 - 31 mmol/L    Anion Gap Unable to perform testing: Specimen hemolyzed. 9 - 17 mmol/L    Glucose Unable to perform testing: Specimen hemolyzed. 70 - 99 mg/dL    BUN Unable to perform testing: Specimen hemolyzed. 8 - 23 mg/dL    Creatinine Unable to perform testing: Specimen hemolyzed. 0.5 - 0.9 mg/dL    Est, Glom Filt Rate Unable to perform testing: Specimen hemolyzed. >60 mL/min/1.73m2    BUN/Creatinine Ratio Unable to perform testing: Specimen hemolyzed. 9 - 20    Calcium Unable to perform testing: Specimen hemolyzed. 8.6 - 10.4 mg/dL    Total Protein Unable to perform testing: Specimen hemolyzed. 6.4 - 8.3 g/dL    Albumin Unable to perform testing: Specimen hemolyzed. 3.5 - 5.2 g/dL    Albumin/Globulin Ratio Unable to perform testing: Specimen hemolyzed. 1.0 - 2.5    Total Bilirubin Unable to perform testing: Specimen hemolyzed. 0.3 - 1.2 mg/dL    Alkaline Phosphatase Unable to perform testing: Specimen hemolyzed. 35 - 104 U/L    ALT Unable to perform testing: Specimen hemolyzed. 5 - 33 U/L     monitoring for toxicity.  Decision regarding hospitalization.           ED Course as of 07/11/24 1558   Thu Jul 11, 2024   0743 I reviewed the patient's chart.  Patient seen by Dr. Villalobos with cardiology on 12/16/2019 for chest pain.  History of right bundle branch block with left anterior fascicular block.  Low risk stress test performed on 1/15/2020. [JA]   0743 EKG:  This EKG is signed and interpreted by Lili rico MD.    Rate: 34  Rhythm: Complete heart block  Interpretation: Complete heart block, normal QTc, nonspecific T wave abnormalities  Comparison: changes compared to previous EKG   [JA]   1014 EP was consulted. Spoke with Dr. Ford.  He states the patient does not want the pacemaker completed today so he will plan to do it tomorrow.  Recommends n.p.o. after midnight.  He recommends admission to the CVICU and Isuprel infusion [JA]   1050 Hospitalist was consulted. Tanisha accepted the patient for admission under Dr. Guan.   [JA]      ED Course User Index  [JA] Lili Flores MD       Patient remained hemodynamically stable throughout ED course.     New Prescriptions    No medications on file         Critical Care:  Please note that the withdrawal or failure to initiate urgent interventions for this patient would likely result in a life threatening deterioration or permanent disability.      Accordingly this patient received 45 minutes of critical care time, excluding separately billable procedures.      Counseling:   The emergency provider has spoken with the patient and discussed today’s results, in addition to providing specific details for the plan of care and counseling regarding the diagnosis and prognosis.  Questions are answered at this time and they are agreeable with the plan.      --------------------------------- IMPRESSION AND DISPOSITION ---------------------------------    IMPRESSION  1. Complete AV block (HCC)    2. Complete heart block (HCC)    3. Essential

## 2024-07-11 NOTE — ED NOTES
This Rn spoke to DR jolly on the phone and stated to keep pt NPO for pacemaker placement today. Still 2 cases before her but hopefully they will be able to get her in today. Pt ready for procedure and family and pt updated on plan of care

## 2024-07-11 NOTE — CONSULTS
[facility] PHYSICIANS- [facility]  Consultation Report  PATIENT: Luzmaria Montes  MEDICAL RECORD NUMBER: [id number]  DATE OF SERVICE:  2024  ATTENDING ELECTROPHYSIOLOGIST: Alejandrina Ford MD  PRIMARY ELECTROPHYSIOLOGIST: Alejandrina Ford MD  REFERRING PHYSICIAN: No ref. provider found and aPco Sykes DO  CHIEF COMPLAINT: Nausea, generalized weakness and leg swollen    HPI: This is a 89 y.o. female with a history of   Patient Active Problem List   Diagnosis    Abnormal EKG    Hyperlipidemia    Hypertension    Blood per rectum    Lumbar spine instability    Complete heart block (HCC)   who presents to the hospital complaining of nausea, generalized weakness and leg swollen for 2 weeks.The patient has history of chronic RBBB since , hypertension, hyperlipidemia, osteoarthritis and chronic hip pain. The patient states that she has been experiencing nausea, generalized weakness and leg swollen for the past 2 weeks. This morning while she was standing in the bathroom she reported dizziness but denies any syncopal episode. She came in to ED and was noted to be bradycardic. EKG showed sinus rhythm with complete AV block with escape rate at 34 bpm. Cardiac electrophysiology service is consulted for complete AV block.    Patient Active Problem List    Diagnosis Date Noted    Complete heart block (HCC) 2024    Lumbar spine instability 01/15/2019    Blood per rectum 2018    Abnormal EKG     Hyperlipidemia     Hypertension        Past Medical History:   Diagnosis Date    Arthritis     Encounter for screening colonoscopy     Hyperlipidemia     Hypertension     Thyroid disease     Thyroid nodule        Family History   Problem Relation Age of Onset    Kidney Disease Mother     Other Sister         presently age 94  (total 8 sisters and 3 brothers all  except one sister)       Social History     Tobacco Use    Smoking status: Never     tissue diaphragmatic attenuation.       The gated SPECT stress imaging in the short, vertical long, and horizontal long axis demonstrated      A mild defect was present in the basal inferolateral and mid inferolateral wall(s) that was  moderate sized by quantification.                                                        The resting images show no change.      Gated SPECT left ventricular ejection fraction was calculated to be 58%, with normal myocardial thickening and wall motion.     Impression:    ECG during the infusion did not change.   The myocardial perfusion imaging was normal with attenuation artifact.       Overall left ventricular systolic function was normal without regional wall motion abnormalities.  Low risk general pharmacologic stress test.     Thank you for sending your patient to this [facility] Nationally Accredited Facility.      Electronically signed by Bal Banegas DO on 1/15/20 at 2:16 PM    I have independently reviewed all of the ECGs and rhythm strips per above     Assessment/Plan: This is a 89 y.o. female with a history of   Patient Active Problem List   Diagnosis    Abnormal EKG    Hyperlipidemia    Hypertension    Blood per rectum    Lumbar spine instability    Complete heart block (HCC)    who presents with complete AV block.    1. Complete AV block  - Underlying chronic RBBB.  - No reversible cause is identified thus far.  - Not on ant AV node blocker agents.  - Symptomatic.  - Risks, benefits, and alternatives of permanent pacemaker implantation were discussed in detail today. These risks include but are not limited to bleeding, infection, blood clot, pneumothorax, hemothorax,cardiac valve damage, cardiac perforation and tamponade required emergent thoracotomy, contrast induced nephropathy leading to short or even long term dialysis, vascular injury requiring emergent surgical repair, lead dislodgement, stroke and even death. The patient understands these risks and agrees to  proceed with pacemaker implantation.    2. RBBB  - Chronic RBBB since 2013.    3. Hypertension  - On Ramipril.    4. Hyperlipidemia    5. Osteoarthritis and chronic hip pain    Recommendations:  Start Isuprel IV drip to keep HR > 60 bpm.  Will proceed with dual chamber pacemaker implantation.  NPO.  Dicussed with the patient and family who agree with the plan.    I have spent a total of 45 CCT minutes with the patient and the family reviewing the above stated recommendations.  And a total of >50% of that time involved face-to-face time providing counseling and or coordination of care with the other providers, reviewing records/tests, counseling/education of the patient, ordering medications/tests/procedures, coordinating care, and documenting clinical information in the EHR.     Thank you for allowing me to participate in your patient's care.  Please call me if there are any questions or concerns.      Alejandrina Ford MD  Cardiac Electrophysiology  [facility]  The Heart and Vascular [place]: [place] [facility]rophysiology  2:09 PM  7/11/2024

## 2024-07-12 ENCOUNTER — APPOINTMENT (OUTPATIENT)
Dept: GENERAL RADIOLOGY | Age: 89
DRG: 243 | End: 2024-07-12
Payer: MEDICARE

## 2024-07-12 VITALS
HEART RATE: 68 BPM | HEIGHT: 65 IN | SYSTOLIC BLOOD PRESSURE: 155 MMHG | BODY MASS INDEX: 29.99 KG/M2 | OXYGEN SATURATION: 96 % | DIASTOLIC BLOOD PRESSURE: 70 MMHG | RESPIRATION RATE: 16 BRPM | WEIGHT: 180 LBS | TEMPERATURE: 99.2 F

## 2024-07-12 LAB
EKG ATRIAL RATE: 34 BPM
EKG ATRIAL RATE: 85 BPM
EKG P AXIS: 47 DEGREES
EKG P AXIS: 62 DEGREES
EKG P-R INTERVAL: 208 MS
EKG Q-T INTERVAL: 436 MS
EKG Q-T INTERVAL: 586 MS
EKG QRS DURATION: 144 MS
EKG QRS DURATION: 146 MS
EKG QTC CALCULATION (BAZETT): 440 MS
EKG QTC CALCULATION (BAZETT): 518 MS
EKG R AXIS: -6 DEGREES
EKG R AXIS: -7 DEGREES
EKG T AXIS: -168 DEGREES
EKG T AXIS: -59 DEGREES
EKG VENTRICULAR RATE: 34 BPM
EKG VENTRICULAR RATE: 85 BPM
ERYTHROCYTE [DISTWIDTH] IN BLOOD BY AUTOMATED COUNT: 13.7 % (ref 11.5–15)
HCT VFR BLD AUTO: 36.1 % (ref 34–48)
HGB BLD-MCNC: 11.8 G/DL (ref 11.5–15.5)
MCH RBC QN AUTO: 31.4 PG (ref 26–35)
MCHC RBC AUTO-ENTMCNC: 32.7 G/DL (ref 32–34.5)
MCV RBC AUTO: 96 FL (ref 80–99.9)
PLATELET # BLD AUTO: 127 K/UL (ref 130–450)
PMV BLD AUTO: 12.1 FL (ref 7–12)
RBC # BLD AUTO: 3.76 M/UL (ref 3.5–5.5)
WBC OTHER # BLD: 5.4 K/UL (ref 4.5–11.5)

## 2024-07-12 PROCEDURE — 6370000000 HC RX 637 (ALT 250 FOR IP): Performed by: INTERNAL MEDICINE

## 2024-07-12 PROCEDURE — 36415 COLL VENOUS BLD VENIPUNCTURE: CPT

## 2024-07-12 PROCEDURE — 6360000002 HC RX W HCPCS: Performed by: INTERNAL MEDICINE

## 2024-07-12 PROCEDURE — 93010 ELECTROCARDIOGRAM REPORT: CPT | Performed by: INTERNAL MEDICINE

## 2024-07-12 PROCEDURE — 85027 COMPLETE CBC AUTOMATED: CPT

## 2024-07-12 PROCEDURE — 71046 X-RAY EXAM CHEST 2 VIEWS: CPT

## 2024-07-12 PROCEDURE — 93280 PM DEVICE PROGR EVAL DUAL: CPT | Performed by: INTERNAL MEDICINE

## 2024-07-12 PROCEDURE — 6370000000 HC RX 637 (ALT 250 FOR IP): Performed by: NURSE PRACTITIONER

## 2024-07-12 PROCEDURE — 2580000003 HC RX 258: Performed by: INTERNAL MEDICINE

## 2024-07-12 PROCEDURE — 99233 SBSQ HOSP IP/OBS HIGH 50: CPT | Performed by: INTERNAL MEDICINE

## 2024-07-12 RX ORDER — DOCUSATE SODIUM 100 MG/1
100 CAPSULE, LIQUID FILLED ORAL 2 TIMES DAILY
COMMUNITY

## 2024-07-12 RX ORDER — TRAMADOL HYDROCHLORIDE 50 MG/1
50 TABLET ORAL EVERY 6 HOURS PRN
COMMUNITY

## 2024-07-12 RX ORDER — ATORVASTATIN CALCIUM 10 MG/1
10 TABLET, FILM COATED ORAL NIGHTLY
Qty: 30 TABLET | Refills: 3 | Status: SHIPPED | OUTPATIENT
Start: 2024-07-12 | End: 2024-07-12 | Stop reason: HOSPADM

## 2024-07-12 RX ORDER — ATORVASTATIN CALCIUM 10 MG/1
10 TABLET, FILM COATED ORAL DAILY
COMMUNITY

## 2024-07-12 RX ADMIN — WATER 1000 MG: 1 INJECTION INTRAMUSCULAR; INTRAVENOUS; SUBCUTANEOUS at 06:19

## 2024-07-12 RX ADMIN — DOCUSATE SODIUM 100 MG: 100 CAPSULE, LIQUID FILLED ORAL at 09:00

## 2024-07-12 RX ADMIN — SODIUM CHLORIDE, PRESERVATIVE FREE 10 ML: 5 INJECTION INTRAVENOUS at 09:00

## 2024-07-12 RX ADMIN — TRAMADOL HYDROCHLORIDE 50 MG: 50 TABLET ORAL at 08:59

## 2024-07-12 RX ADMIN — ATENOLOL 50 MG: 50 TABLET ORAL at 09:00

## 2024-07-12 ASSESSMENT — PAIN DESCRIPTION - LOCATION
LOCATION: HIP
LOCATION: HIP
LOCATION: CHEST;INCISION

## 2024-07-12 ASSESSMENT — PAIN SCALES - GENERAL
PAINLEVEL_OUTOF10: 2
PAINLEVEL_OUTOF10: 8
PAINLEVEL_OUTOF10: 1

## 2024-07-12 ASSESSMENT — PAIN DESCRIPTION - DESCRIPTORS
DESCRIPTORS: ACHING;SHARP
DESCRIPTORS: ACHING;DULL;DISCOMFORT

## 2024-07-12 ASSESSMENT — PAIN DESCRIPTION - ORIENTATION
ORIENTATION: RIGHT
ORIENTATION: RIGHT
ORIENTATION: LEFT;UPPER

## 2024-07-12 ASSESSMENT — PAIN DESCRIPTION - PAIN TYPE: TYPE: ACUTE PAIN

## 2024-07-12 NOTE — ACP (ADVANCE CARE PLANNING)
Advance Care Planning   Healthcare Decision Maker:    Primary Decision Maker: clyde gautam - [phone]    Click here to complete Healthcare Decision Makers including selection of the Healthcare Decision Maker Relationship (ie \"Primary\").       Electronically signed by Jayce Padilla RN CM on 7/12/2024 at 12:48 PM

## 2024-07-12 NOTE — PLAN OF CARE
Problem: Discharge Planning  Goal: Discharge to home or other facility with appropriate resources  7/12/2024 0833 by Yesenia Donovan RN  Outcome: Progressing  7/11/2024 1945 by Tegan Corral RN  Outcome: Progressing     Problem: ABCDS Injury Assessment  Goal: Absence of physical injury  7/12/2024 0833 by Yesenia Donovan RN  Outcome: Progressing  7/11/2024 1945 by Tegan Corral RN  Outcome: Progressing     Problem: Safety - Adult  Goal: Free from fall injury  7/12/2024 0833 by Yesenia Donovan RN  Outcome: Progressing  7/11/2024 1945 by Tegan Corral RN  Outcome: Progressing     Problem: Pain  Goal: Verbalizes/displays adequate comfort level or baseline comfort level  Outcome: Progressing     Problem: Cardiovascular - Adult  Goal: Maintains optimal cardiac output and hemodynamic stability  Outcome: Progressing  Goal: Absence of cardiac dysrhythmias or at baseline  Outcome: Progressing     Problem: Skin/Tissue Integrity - Adult  Goal: Skin integrity remains intact  Outcome: Progressing     Problem: Musculoskeletal - Adult  Goal: Return mobility to safest level of function  Outcome: Progressing     Problem: Infection - Adult  Goal: Absence of infection at discharge  Outcome: Progressing     Problem: Metabolic/Fluid and Electrolytes - Adult  Goal: Electrolytes maintained within normal limits  Outcome: Progressing  Goal: Hemodynamic stability and optimal renal function maintained  Outcome: Progressing

## 2024-07-12 NOTE — H&P
Hospital Medicine History & Physical      PCP: Paco Sykes DO    Date of Admission: 2024    Date of Service: . 2024    Chief Complaint:   SOB, FATIGUE, WEAKNESS      History Of Present Illness:     89 y.o. female presented with  SOB, FATIGUE, WEAKNESS.  SEVERAL DAYS BEFORE ADMISSION.  CAME TO THE ER , FOUND TO BE IN CHF.   HAD A PACER PLACED ON YESTERDAY     Past Medical History:          Diagnosis Date    Arthritis     Encounter for screening colonoscopy     Hyperlipidemia     Hypertension     Thyroid disease     Thyroid nodule        Past Surgical History:          Procedure Laterality Date    COLONOSCOPY      ENDOSCOPY, COLON, DIAGNOSTIC      EP DEVICE PROCEDURE N/A 2024    Insert PPM dual performed by Ingrid Bey MD [facility]    HYSTERECTOMY (CERVIX STATUS UNKNOWN)      OK COLONOSCOPY FLX DX W/COLLJ SPEC WHEN PFRMD N/A 2018    COLONOSCOPY DIAGNOSTIC performed by Nael Tena MD at [facility] ENDOSCOPY    THYROID SURGERY      nodule removed       Medications Prior to Admission:      Prior to Admission medications    Medication Sig Start Date End Date Taking? Authorizing Provider   atenolol (TENORMIN) 25 MG tablet Take 1 tablet by mouth daily   Yes Provider, MD Francisco       Allergies:  Patient has no known allergies.    Social History:      The patient currently lives DAUGHTER    TOBACCO:   reports that she has never smoked. She has never used smokeless tobacco.  ETOH:   reports no history of alcohol use.      Family History:       Reviewed in detail and negative for DM, CAD, Cancer, CVA. Positive as follows:        Problem Relation Age of Onset    Kidney Disease Mother     Other Sister         presently age 94  (total 8 sisters and 3 brothers all  except one sister)       REVIEW OF SYSTEMS:   Pertinent positives as noted in the HPI.  All other systems reviewed and negative.    PHYSICAL EXAM:    BP (!) 143/64   Pulse 59   Temp 98.8 °F (37.1 °C) (Temporal)   Resp 16   Ht 1.651 m (5' 5\")   Wt 81.6 kg (180 lb)   SpO2 95%   BMI 29.95 kg/m²     General appearance:  No apparent distress, appears stated age.  HEENT:  Normal cephalic, atraumatic without obvious deformity. Pupils equal, round, and reactive to light.  Extra ocular muscles intact. Conjunctivae/corneas clear.  Neck: Supple, with full range of motion. No jugular venous distention. Trachea midline.  Respiratory:  Normal respiratory effort. Clear to auscultation,   Cardiovascular:  RRR  Abdomen: Soft, non-tender, non-distended with normal bowel sounds.  Musculoskeletal:  No clubbing, cyanosis POS edema bilaterally.    Skin: smooth and dry   Neurologic:   Cranial nerves: II-XII intact,   Psychiatric:  Alert and oriented x 3        Labs:     Recent Labs     07/11/24  0750 07/12/24  0611   WBC 5.4 5.4   HGB 12.3 11.8   HCT 37.7 36.1    127*     Recent Labs     07/11/24  0750 07/11/24  0933   NA Unable to perform testing: Specimen hemolyzed. 141   K Unable to perform testing: Specimen hemolyzed. 5.0   CL Unable to perform testing: Specimen hemolyzed. 108*   CO2 Unable to perform testing: Specimen hemolyzed. 25   BUN Unable to perform testing: Specimen hemolyzed. 16   CREATININE Unable to perform testing: Specimen hemolyzed. 1.1*   CALCIUM Unable to perform testing: Specimen hemolyzed. 9.2     Recent Labs     07/11/24  0750 07/11/24  0933   AST Unable to perform testing: Specimen hemolyzed. 16   ALT Unable to perform testing: Specimen hemolyzed. 22   BILITOT Unable to perform testing: Specimen hemolyzed. 0.4   ALKPHOS Unable to perform testing: Specimen hemolyzed. 88     No results for input(s): \"INR\" in the last 72 hours.  Recent Labs     07/11/24  0933   CKTOTAL 73       Urinalysis:      Lab Results   Component Value Date/Time    NITRU Negative 06/12/2021 11:29 AM    WBCUA 0-1  08/19/2018 09:30 AM    WBCUA 0-1 12/22/2011 02:20 PM    BACTERIA NONE 08/19/2018 09:30 AM    RBCUA 1-3 08/19/2018 09:30 AM    RBCUA 0-1 12/22/2011 02:20 PM    BLOODU Negative 06/12/2021 11:29 AM    SPECGRAV 1.020 03/23/2018 02:29 PM    GLUCOSEU Negative 06/12/2021 11:29 AM    GLUCOSEU NEGATIVE 12/22/2011 02:20 PM       Radiology:           XR CHEST (2 VW)   Final Result   Trace bilateral pleural effusions/thickening. Bibasilar subsegmental   atelectasis or scarring. Low lung volumes with central and bibasilar   subsegmental atelectasis.         XR CHEST PORTABLE   Final Result   1. Left pacemaker is present.   2. No pneumothorax.   3. Interstitial prominence bilaterally which may indicate pulmonary vascular   congestion or peribronchial inflammation.         XR CHEST PORTABLE   Final Result   No acute process.             ASSESSMENT:    Active Hospital Problems    Diagnosis Date Noted    Complete AV block (HCC) [I44.2] 07/11/2024   HTN  HLD    PLAN:  HYDRALAZINE   LIPITOR   TENORMIN       DVT Prophylaxis:  SCD  Diet: ADULT DIET; Regular  Code Status: Full Code    PT/OT Eval Status: ORDERED    Dispo - DC HOME IN AM    Electronically signed by Pacheco Morales DO on 7/12/2024 at 12:46 PM FACOI       Thank you Paco Sykes DO for the opportunity to be involved in this patient's care. If you have any questions or concerns please feel free to contact me at [phone]

## 2024-07-12 NOTE — CARE COORDINATION
07/12/24 Transition of care:  Pt admitted from ED for complete AV block EP consulted and pacer has been placed Met with pt to discuss transition of care and discharge preferences Pt daughter included in discharge planning Pt lives by self in a one story home with 2 stairs no handrail to enter Pt is independent with ADLs and had caregivers 2 days per week through [employer] Pt has no HHC or [facility] history PCP is [facility] Plan is to return home at discharge with no needs expressed at this time Daughter will transport CM/SW to follow Electronically signed by Jayce Padilla RN CM on 7/12/2024 at 12:59 PM     Case Management Assessment  Initial Evaluation    Date/Time of Evaluation: 7/12/2024 12:59 PM  Assessment Completed by: Jayce Padilla RN    If patient is discharged prior to next notation, then this note serves as note for discharge by case management.    Patient Name: Luzmaria Montes                   YOB: 1933  Diagnosis: Complete heart block (HCC) [I44.2]  Bradycardia [R00.1]  Complete AV block (HCC) [I44.2]  Essential hypertension [I10]  Typical atrial flutter (HCC) [I48.3]                   Date / Time: 7/11/2024  7:26 AM    Patient Admission Status: Inpatient   Readmission Risk (Low < 19, Mod (19-27), High > 27): Readmission Risk Score: 10.5    Current PCP: Paco Sykes, DO  PCP verified by CM? Yes    Chart Reviewed: Yes      History Provided by: Patient  Patient Orientation: Alert and Oriented    Patient Cognition: Alert    Hospitalization in the last 30 days (Readmission):  No    If yes, Readmission Assessment in  Navigator will be completed.    Advance Directives:      Code Status: Full Code   Patient's Primary Decision Maker is: Legal Next of Kin    Primary Decision Maker: clyde gautam - Child - [phone]    Discharge Planning:    Patient lives with: Alone Type of Home: House  Primary Care Giver: Self (pt also has [employer] 2 days per

## 2024-07-12 NOTE — DISCHARGE INSTRUCTIONS
[facility] Pacemaker Instructions    Medications:  Resume all home medications    Incision Care:    Brown Aquacel dressing: Located over your incision. Remove in 1 week (7/18/24).   Surgical glue: Located under the brown dressings and over your incision. This glue is there to prevent infection. Do NOT scrub, itch, pick, or remove the glue as this could lead to infection. The glue will fall off on its own over the next 6 weeks.  Daily monitoring: Check incision sites on chest daily. Notify the office at [phone] if you develop any redness, swelling, drainage, warmth, or fever greater than 100 degrees.     Bathing:  Keep the incision dry. You may shower tomorrow evening after you remove the white pressure dressing, but do NOT spray the water directly at the site or scrub at the site. Pat dry only with a clean towel. No soaking in a bathtub, hot tub, or pool for 6 weeks.     Activity:  You may resume normal activity with left arm restrictions.  Arm restrictions:  Arm immobilizer: Wear the arm immobilizer at all times for 48 hours except to shower, toilet, and eat. After 48 hours you do not have to wear it anymore during the day but be sure to wear it at night for the next 4 weeks. After 4 weeks you do not need to wear the arm immobilizer anymore. The immobilizer should be loose, not tight in order to avoid restriction of blood flow.  Avoid pulling yourself up with your arm, pushing or stretching motions.   Do not raise left elbow higher than shoulder height and do not lift anything weighing more than 3 pounds for 6 weeks.    Do not do any activities such as golfing, vacuuming, or mowing the lawn for 6 weeks.    Prevent any hard blows to the pacemaker.      Driving: You may drive, if you feel up to it, in 14 days, or after your follow up appointment. Start with local/short trips to familiar places. Avoid highway/ high-speed driving for the first few days after you resume driving.  DO NOT drive until  credit card and should arrive within 8 weeks.  Carry your ID card with you at all times    Follow Up:  Device Clinic: You will be seen on 7/26/24 at 10:00AM at the Device Clinic for  incision check and brief Pacemaker evaluation.  If you need to reschedule this appointment, call the office at [phone].      [facility]  [address] [place] [address], [place]  [address]  [phone]

## 2024-07-12 NOTE — PROGRESS NOTES
[facility] PHYSICIANS- [facility]  Consultation Report  PATIENT: Luzmaria Montes  MEDICAL RECORD NUMBER: [id number]  DATE OF SERVICE:  7/12/2024  ATTENDING ELECTROPHYSIOLOGIST: ALON Espinal - CNP  PRIMARY ELECTROPHYSIOLOGIST: Alejandrina Ford MD  REFERRING PHYSICIAN: No ref. provider found and Paco Sykes DO  CHIEF COMPLAINT: Nausea, generalized weakness and leg swollen    HPI: This is a 89 y.o. female with a history of   Patient Active Problem List   Diagnosis    Abnormal EKG    Hyperlipidemia    Hypertension    Blood per rectum    Lumbar spine instability    Complete AV block (HCC)   who presents to the hospital complaining of nausea, generalized weakness and leg swollen for 2 weeks.The patient has history of chronic RBBB since 2013, hypertension, hyperlipidemia, osteoarthritis and chronic hip pain. The patient states that she has been experiencing nausea, generalized weakness and leg swollen for the past 2 weeks. This morning while she was standing in the bathroom she reported dizziness but denies any syncopal episode. She came in to ED and was noted to be bradycardic. EKG showed sinus rhythm with complete AV block with escape rate at 34 bpm. Cardiac electrophysiology service is consulted for complete AV block.    7/12/24: Patient is s/p dual chamber pacemaker, yesterday 7/11/24. Voiced no new complaints.CXR showing no pneumothorax and leads in good position. Pocket incision reveals no bleeding or hematoma.    Patient Active Problem List    Diagnosis Date Noted    Complete AV block (HCC) 07/11/2024    Lumbar spine instability 01/15/2019    Blood per rectum 07/13/2018    Abnormal EKG     Hyperlipidemia     Hypertension        Past Medical History:   Diagnosis Date    Arthritis     Encounter for screening colonoscopy     Hyperlipidemia     Hypertension     Thyroid disease     Thyroid nodule        Family History   Problem Relation Age of Onset    Kidney  leading to short or even long term dialysis, vascular injury requiring emergent surgical repair, lead dislodgement, stroke and even death. The patient understands these risks and agrees to proceed with pacemaker implantation.    2. RBBB  - Chronic RBBB since 2013.    3. Hypertension  - On Ramipril.    4. Hyperlipidemia    5. Osteoarthritis and chronic hip pain    Recommendations:  S/p dual chamber pacemaker for complete AV block  Device interrogation reveals normal device function  CXR without evidence of pneumothorax and leads in correct positions  From EP standpoint, patient may be discharged.    Further recommendations to follow per MD Leon    Thank you for allowing me to participate in your patient's care.  Please call me if there are any questions or concerns.    Discussed with MD Radha Quintero APRN - [facility]  Cardiac Electrophysiology  [facility]  The Heart and Vascular [place]: [place] [facility]  11:55 AM  7/12/2024    Attending Physician's Statement    Patient seen with the ANP. Agree with the findings, assessment and plan. Management plan was discussed. I have personally interviewed the patient, independently performed a focused cardiac examination, reviewed the pertinent laboratory and diagnostic testing with the patient and directly participated in the medical decision-making as noted above with the following additions:     Normal pacemaker function. CXR showed good lead positions and no pneumothorax. OK to discharged home and follow up in 2 week in device clinic.    I have spent a total of 50 minutes with the patient and the family reviewing the above stated recommendations.  And a total of >50% of that time involved face-to-face time providing counseling and/or coordination of care with the other providers, reviewing records/tests, counseling/education of the patient, ordering medications/tests/procedures, coordinating care, and documenting clinical  information in the EHR.    Alejandrina Ford MD  Cardiac Electrophysiology  [facility]  The Heart and Vascular [place]: [place] Electrophysiology  10:56 PM  7/12/2024

## 2024-07-13 NOTE — DISCHARGE SUMMARY
Hospitalist Discharge Summary    Patient ID: Luzmaria Montes   Patient : 10/19/1933  Patient's PCP: Paco Sykes DO    Admit Date: 2024   Admitting Physician: Pacheco Morales DO    Discharge Date:  2024  Discharge Physician: Pacheco Morales DO   Discharge Condition: Stable  Discharge Disposition: Home      Discharge Diagnoses:     Active Hospital Problems    Diagnosis Date Noted    Complete AV block (HCC) [I44.2] 2024   HTN  HLD  S/P PACER      Hospital course in brief:    89 y.o. female presented with  SOB, FATIGUE, WEAKNESS.  SEVERAL DAYS BEFORE ADMISSION.  CAME TO THE ER , FOUND TO BE IN CHF.   HAD A PACER PLACED ON YESTERDAY     PHYSICAL EXAM:    BP (!) 155/70   Pulse 68   Temp 99.2 °F (37.3 °C) (Temporal)   Resp 16   Ht 1.651 m (5' 5\")   Wt 81.6 kg (180 lb)   SpO2 96%   BMI 29.95 kg/m²   General appearance:  No apparent distress, appears stated age.  HEENT:  Normal cephalic, atraumatic without obvious deformity. Pupils equal, round, and reactive to light.  Extra ocular muscles intact. Conjunctivae/corneas clear.  Neck: Supple, with full range of motion. No jugular venous distention. Trachea midline.  Respiratory:  Normal respiratory effort. Clear to auscultation,   Cardiovascular:  RRR  Abdomen: Soft, non-tender, non-distended with normal bowel sounds.  Musculoskeletal:  No clubbing, cyanosis POS edema bilaterally.    Skin: smooth and dry   Neurologic:   Cranial nerves: II-XII intact,   Psychiatric:  Alert and oriented x 3         Prior to Admission medications    Medication Sig Start Date End Date Taking? Authorizing Provider   traMADol (ULTRAM) 50 MG tablet Take 1 tablet by mouth every 6 hours as needed for Pain. Max Daily Amount: 200 mg   Yes Francisco Velásquez MD   atorvastatin (LIPITOR) 10 MG tablet Take 1 tablet by mouth daily   Yes Francisco Velásquez MD   docusate sodium (COLACE) 100 MG capsule Take 1 capsule by mouth 2 times daily   Yes Provider

## 2024-07-15 ENCOUNTER — NURSE ONLY (OUTPATIENT)
Dept: NON INVASIVE DIAGNOSTICS | Age: 89
End: 2024-07-15

## 2024-07-15 ENCOUNTER — TELEPHONE (OUTPATIENT)
Dept: NON INVASIVE DIAGNOSTICS | Age: 89
End: 2024-07-15

## 2024-07-15 NOTE — TELEPHONE ENCOUNTER
Patient's daughter called in concerned about patient's device site.  They stated tat her site is more swollen than yesterday, temp is 99 degrees and bp is up.  They deny any major redness or seeping from wound.  Per device clinic bring patient in today if she can come in for wound check.

## 2024-07-15 NOTE — PROGRESS NOTES
Here for wound assessment after family called voicing concern about a temp of 99 degrees and swelling at the site. Family concerned because patient was not on antibiotics. I assessed the left chest area. She has a Aquacell dressing over the incision. I informed the family the Aquacell dressing has an antimicrobial patch to help decrease infection in post op wounds. The dressing will remain in place until Thursday, 7/18/2024. I told the family they can remove the dressing on Thursday, be careful to not remove the steri strips underneath the dressing. There is minimal swelling beneath the Aquacell dressing, minimal ecchymosis noted without drainage.  I asked the family if Luzmaria has air conditioning. Luzmaria does not have air conditioning in the home. I explained that when the home is hot the body temperature will increase especially when the outside temp and humidity are high (85-90 degrees with high humidity). I encouraged the family to use fans in the home.   Luzmaria is scheduled for her two week post implant wound assessment on 7/26/2024. I asked the family to contact the office if Luzmaria develops a fever, chills, redness or drainage at the left chest incision. They will do so. The family states Luzmaria does not have a bedside monitor. I will order one in the [employer] website.    Celi Stone RN, BSN  [facility]

## 2024-07-15 NOTE — PATIENT INSTRUCTIONS
Continue arm restrictions until 8/23  You may shower starting  pending office appt 7/26/24    Call if any signs or symptoms of infection [phone] ext: [phone]  Fevers, chills, redness, swelling or drainage.     Remove Aquacell dressing on 7/18/2024, take care not to remove the steri strips from beneath the Aquacell dressing      Hook up home  Monitor  [place] ( [facility]) [employer] support ( home monitoring) [phone]

## 2024-07-20 NOTE — PROGRESS NOTES
Physician Progress Note      PATIENT:               AWILDA ALEXANDER  CSN #:                  [id number]  :                       10/19/1933  ADMIT DATE:       2024 7:26 AM  DISCH DATE:        2024 4:08 PM  RESPONDING  PROVIDER #:        Pacheco Watson DO          QUERY TEXT:      Patient admitted with AV block, noted to have elevated troponin. If possible,   please document in progress notes and discharge summary if you are evaluating   and/or treating any of the following:    The medical record reflects the following:  Risk Factors: AV Block,HTN,HF  Clinical Indicators:Troponin 38,59.ECG -Nonspecific intraventricular   block T wave abnormality, consider inferolateral ischemia Abnormal ECG  Treatment: Srial lab monitoring    Thank you  KRISHNA Quiroz,CDS  Options provided:  -- type 2 MI  -- Demand ischemia  -- elevated troponin  not clinically significant  -- Other - I will add my own diagnosis  -- Disagree - Not applicable / Not valid  -- Disagree - Clinically unable to determine / Unknown  -- Refer to Clinical Documentation Reviewer    PROVIDER RESPONSE TEXT:    This patient has Demand ischemia    Query created by: Gabriella Christina on 2024 12:03 AM      Electronically signed by:  Pacheco Watson DO 2024 12:40 PM

## 2024-08-02 ENCOUNTER — NURSE ONLY (OUTPATIENT)
Dept: NON INVASIVE DIAGNOSTICS | Age: 89
End: 2024-08-02
Payer: MEDICARE

## 2024-08-02 ENCOUNTER — TELEPHONE (OUTPATIENT)
Dept: NON INVASIVE DIAGNOSTICS | Age: 89
End: 2024-08-02

## 2024-08-02 DIAGNOSIS — Z95.0 PACEMAKER: Primary | ICD-10-CM

## 2024-08-02 PROCEDURE — 93280 PM DEVICE PROGR EVAL DUAL: CPT | Performed by: INTERNAL MEDICINE

## 2024-08-02 NOTE — PATIENT INSTRUCTIONS
Continue arm restrictions until 8.22.2024.  You may shower starting today     Call if any signs or symptoms of infection [phone] ext: [phone]  Fevers, chills, redness, swelling or drainage.       Keep your monitor hooked up.   [place] ( [facility]) [employer] support ( home monitoring) [phone]    Electrophysiologist: Dr Ford  Cardiologist: Dr. Villalobos

## 2024-08-02 NOTE — TELEPHONE ENCOUNTER
----- Message from Joy Lim sent at 7/30/2024  6:54 AM EDT -----  Regarding: FW: tala 3 month check    ----- Message -----  From: Celi Sotne RN  Sent: 7/29/2024  10:37 AM EDT  To: Joy Lim  Subject: abbott 3 month check                             Patient was a no show for her 2 week post implant wound check on 7/26. She will need a 3 month appointment with HARSHIL thanks

## 2024-08-02 NOTE — PROGRESS NOTES
Physician Progress Note      PATIENT:               AWILDA ALEXANDER  CSN #:                  [id number]  :                       10/19/1933  ADMIT DATE:       2024 7:26 AM  DISCH DATE:        2024 4:08 PM  RESPONDING  PROVIDER #:        Pacheco Morales DO          QUERY TEXT:    Pt admitted with complete heart block. Pt noted to have CHF in DS dated .   If possible, please document in progress notes and discharge summary if you   are evaluating and/or treating any of the following:    The medical record reflects the following:  Risk Factors: myocardial ischemia, complete heart block  Clinical Indicators: ED - Also noted to have pitting edema to her lower   extremities which is new.  She denies any history of CHF  H&P -emale presented with SOB, FATIGUE, WEAKNESS.  SEVERAL DAYS BEFORE   ADMISSION.  CAME TO THE ER , FOUND TO BE IN CHF.  Examination-Musculoskeletal:  No clubbing, cyanosis POS edema bilaterally.  Chest XRAY--Trace bilateral pleural effusions/thickening. Bibasilar   subsegmental atelectasis or scarring  TTE -Normal left ventricular systolic function with a visually estimated   EF of 55 - 60%.Left ventricle size is normal. Normal wall thickness. Normal   wall motion. Indeterminate diastolic function.  Right Ventricle, BNP- 816  Treatment: IV isoproterenol in sodium chloride    Thank you  KRISHNA Quiroz,CDS  Options provided:  -- Acute on Chronic Diastolic CHF/HFpEF  -- Acute Diastolic CHF/HFpEF  -- Chronic Diastolic CHF/HFpEF  -- Other - I will add my own diagnosis  -- Disagree - Not applicable / Not valid  -- Disagree - Clinically unable to determine / Unknown  -- Refer to Clinical Documentation Reviewer    PROVIDER RESPONSE TEXT:    This patient is in acute diastolic CHF/HFpEF.    Query created by: Gabriella Christina on 2024 12:43 AM      Electronically signed by:  Pacheco Morales DO 2024 1:59 PM

## 2024-10-18 ENCOUNTER — OFFICE VISIT (OUTPATIENT)
Dept: NON INVASIVE DIAGNOSTICS | Age: 89
End: 2024-10-18

## 2024-10-18 VITALS
RESPIRATION RATE: 18 BRPM | BODY MASS INDEX: 33.13 KG/M2 | WEIGHT: 187 LBS | HEIGHT: 63 IN | DIASTOLIC BLOOD PRESSURE: 84 MMHG | SYSTOLIC BLOOD PRESSURE: 136 MMHG | HEART RATE: 67 BPM

## 2024-10-18 DIAGNOSIS — I44.2 COMPLETE AV BLOCK (HCC): ICD-10-CM

## 2024-10-18 DIAGNOSIS — Z95.0 PACEMAKER: Primary | ICD-10-CM

## 2024-10-18 DIAGNOSIS — I10 HYPERTENSION, UNSPECIFIED TYPE: ICD-10-CM

## 2024-10-18 RX ORDER — SPIRONOLACTONE 25 MG/1
25 TABLET ORAL DAILY
COMMUNITY
Start: 2024-08-15

## 2024-10-18 RX ORDER — M-VIT,TX,IRON,MINS/CALC/FOLIC 27MG-0.4MG
1 TABLET ORAL DAILY
COMMUNITY

## 2024-10-18 RX ORDER — HYDROXYZINE PAMOATE 25 MG/1
CAPSULE ORAL
COMMUNITY
Start: 2024-09-12

## 2024-10-18 RX ORDER — ZOLPIDEM TARTRATE 10 MG/1
10 TABLET ORAL NIGHTLY
COMMUNITY
Start: 2024-08-26

## 2024-10-18 RX ORDER — UREA 10 %
500 LOTION (ML) TOPICAL DAILY
COMMUNITY

## 2024-10-18 RX ORDER — PANTOPRAZOLE SODIUM 40 MG/1
TABLET, DELAYED RELEASE ORAL
COMMUNITY
Start: 2024-10-13

## 2024-10-18 NOTE — PROGRESS NOTES
2 times daily      atenolol (TENORMIN) 25 MG tablet Take 1 tablet by mouth daily       No current facility-administered medications for this visit.        No Known Allergies    ROS:   Constitutional: Negative for fever, activity change and appetite change.   HENT: Negative for epistaxis.   Eyes: Negative for diploplia, blurred vision.   Respiratory: Negative for cough, chest tightness, shortness of breath and wheezing.   Cardiovascular: pertinent positives in HPI  Gastrointestinal: Negative for abdominal pain and blood in stool.   All other review of systems are negative     PHYSICAL EXAM:      Vitals:    10/18/24 1004   BP: 136/84   Pulse: 67   Resp: 18   Weight: 84.8 kg (187 lb)   Height: 1.588 m (5' 2.5\")     Constitutional: well-developed, no acute distress  Eyes: conjunctivae normal, no xanthelasma   Ears, Nose, Throat: oral mucosa moist, no cyanosis   CV: no JVD. Regular rate and rhythm. Normal S1S2 and no S3. No murmurs, rubs, or gallops. PMI is nondisplaced  Lungs: clear to auscultation bilaterally, normal respiratory effort without used of accessory muscles  Abdomen: soft, non-tender, bowel sounds present, no masses or hepatomegaly   Musculoskeletal: no digital clubbing, no edema   Skin: warm, no rashes     Data:    Lab Results   Component Value Date    WBC 5.4 07/12/2024    HGB 11.8 07/12/2024    HCT 36.1 07/12/2024    MCV 96.0 07/12/2024     (L) 07/12/2024     Lab Results   Component Value Date/Time     07/11/2024 09:33 AM    K 5.0 07/11/2024 09:33 AM    K 4.8 06/12/2021 11:29 AM     07/11/2024 09:33 AM    CO2 25 07/11/2024 09:33 AM    BUN 16 07/11/2024 09:33 AM    CREATININE 1.1 07/11/2024 09:33 AM    GLUCOSE 96 07/11/2024 09:33 AM    GLUCOSE 110 02/24/2011 04:33 AM    CALCIUM 9.2 07/11/2024 09:33 AM    LABGLOM 46 07/11/2024 09:33 AM      Lab Results   Component Value Date/Time    MG 2.4 07/11/2024 09:33 AM     Lab Results   Component Value Date    TSH 1.43 07/11/2024     EKG: Sinus

## 2024-12-30 ENCOUNTER — APPOINTMENT (OUTPATIENT)
Dept: CT IMAGING | Age: 88
End: 2024-12-30
Attending: STUDENT IN AN ORGANIZED HEALTH CARE EDUCATION/TRAINING PROGRAM
Payer: MEDICARE

## 2024-12-30 ENCOUNTER — HOSPITAL ENCOUNTER (EMERGENCY)
Age: 88
Discharge: HOME | End: 2024-12-31
Attending: STUDENT IN AN ORGANIZED HEALTH CARE EDUCATION/TRAINING PROGRAM
Payer: MEDICARE

## 2024-12-30 DIAGNOSIS — J11.1 INFLUENZA WITH RESPIRATORY MANIFESTATION OTHER THAN PNEUMONIA: ICD-10-CM

## 2024-12-30 DIAGNOSIS — R19.7 DIARRHEA, UNSPECIFIED TYPE: ICD-10-CM

## 2024-12-30 DIAGNOSIS — R11.2 NAUSEA VOMITING AND DIARRHEA: Primary | ICD-10-CM

## 2024-12-30 DIAGNOSIS — R19.7 NAUSEA VOMITING AND DIARRHEA: Primary | ICD-10-CM

## 2024-12-30 LAB
ALBUMIN SERPL-MCNC: 3.8 G/DL (ref 3.5–5.2)
ALP SERPL-CCNC: 87 U/L (ref 35–104)
ALT SERPL-CCNC: 26 U/L (ref 0–32)
ANION GAP SERPL CALCULATED.3IONS-SCNC: 13 MMOL/L (ref 7–16)
AST SERPL-CCNC: 36 U/L (ref 0–31)
BASOPHILS # BLD: 0.02 K/UL (ref 0–0.2)
BASOPHILS NFR BLD: 1 % (ref 0–2)
BILIRUB SERPL-MCNC: 0.4 MG/DL (ref 0–1.2)
BILIRUB UR QL STRIP: NEGATIVE
BUN SERPL-MCNC: 13 MG/DL (ref 6–23)
CALCIUM SERPL-MCNC: 8.8 MG/DL (ref 8.6–10.2)
CHLORIDE SERPL-SCNC: 104 MMOL/L (ref 98–107)
CLARITY UR: CLEAR
CO2 SERPL-SCNC: 19 MMOL/L (ref 22–29)
COLOR UR: YELLOW
CREAT SERPL-MCNC: 1 MG/DL (ref 0.5–1)
EOSINOPHIL # BLD: 0 K/UL (ref 0.05–0.5)
EOSINOPHILS RELATIVE PERCENT: 0 % (ref 0–6)
ERYTHROCYTE [DISTWIDTH] IN BLOOD BY AUTOMATED COUNT: 13.5 % (ref 11.5–15)
FLUAV RNA RESP QL NAA+PROBE: DETECTED
FLUBV RNA RESP QL NAA+PROBE: NOT DETECTED
GFR, ESTIMATED: 54 ML/MIN/1.73M2
GLUCOSE SERPL-MCNC: 101 MG/DL (ref 74–99)
GLUCOSE UR STRIP-MCNC: NEGATIVE MG/DL
HCT VFR BLD AUTO: 42 % (ref 34–48)
HGB BLD-MCNC: 13.7 G/DL (ref 11.5–15.5)
HGB UR QL STRIP.AUTO: ABNORMAL
IMM GRANULOCYTES # BLD AUTO: <0.03 K/UL (ref 0–0.58)
IMM GRANULOCYTES NFR BLD: 0 % (ref 0–5)
KETONES UR STRIP-MCNC: NEGATIVE MG/DL
LACTATE BLDV-SCNC: 1 MMOL/L (ref 0.5–2.2)
LEUKOCYTE ESTERASE UR QL STRIP: NEGATIVE
LIPASE SERPL-CCNC: 35 U/L (ref 13–60)
LYMPHOCYTES NFR BLD: 0.84 K/UL (ref 1.5–4)
LYMPHOCYTES RELATIVE PERCENT: 19 % (ref 20–42)
MCH RBC QN AUTO: 31.2 PG (ref 26–35)
MCHC RBC AUTO-ENTMCNC: 32.6 G/DL (ref 32–34.5)
MCV RBC AUTO: 95.7 FL (ref 80–99.9)
MONOCYTES NFR BLD: 0.6 K/UL (ref 0.1–0.95)
MONOCYTES NFR BLD: 14 % (ref 2–12)
NEUTROPHILS NFR BLD: 67 % (ref 43–80)
NEUTS SEG NFR BLD: 2.93 K/UL (ref 1.8–7.3)
NITRITE UR QL STRIP: NEGATIVE
PH UR STRIP: 6 [PH] (ref 5–9)
PLATELET, FLUORESCENCE: 125 K/UL (ref 130–450)
PMV BLD AUTO: 10.9 FL (ref 7–12)
POTASSIUM SERPL-SCNC: 3.7 MMOL/L (ref 3.5–5)
PROT SERPL-MCNC: 7.9 G/DL (ref 6.4–8.3)
PROT UR STRIP-MCNC: NEGATIVE MG/DL
RBC # BLD AUTO: 4.39 M/UL (ref 3.5–5.5)
RBC #/AREA URNS HPF: ABNORMAL /HPF
SARS-COV-2 RNA RESP QL NAA+PROBE: NOT DETECTED
SODIUM SERPL-SCNC: 136 MMOL/L (ref 132–146)
SOURCE: ABNORMAL
SP GR UR STRIP: 1.01 (ref 1–1.03)
SPECIMEN DESCRIPTION: ABNORMAL
TROPONIN I SERPL HS-MCNC: 12 NG/L (ref 0–9)
UROBILINOGEN UR STRIP-ACNC: 0.2 EU/DL (ref 0–1)
WBC #/AREA URNS HPF: ABNORMAL /HPF
WBC OTHER # BLD: 4.4 K/UL (ref 4.5–11.5)

## 2024-12-30 PROCEDURE — 80053 COMPREHEN METABOLIC PANEL: CPT

## 2024-12-30 PROCEDURE — 99285 EMERGENCY DEPT VISIT HI MDM: CPT

## 2024-12-30 PROCEDURE — 6370000000 HC RX 637 (ALT 250 FOR IP): Performed by: STUDENT IN AN ORGANIZED HEALTH CARE EDUCATION/TRAINING PROGRAM

## 2024-12-30 PROCEDURE — 87636 SARSCOV2 & INF A&B AMP PRB: CPT

## 2024-12-30 PROCEDURE — 2580000003 HC RX 258: Performed by: STUDENT IN AN ORGANIZED HEALTH CARE EDUCATION/TRAINING PROGRAM

## 2024-12-30 PROCEDURE — 93005 ELECTROCARDIOGRAM TRACING: CPT | Performed by: STUDENT IN AN ORGANIZED HEALTH CARE EDUCATION/TRAINING PROGRAM

## 2024-12-30 PROCEDURE — 84484 ASSAY OF TROPONIN QUANT: CPT

## 2024-12-30 PROCEDURE — 83605 ASSAY OF LACTIC ACID: CPT

## 2024-12-30 PROCEDURE — 6360000004 HC RX CONTRAST MEDICATION: Performed by: RADIOLOGY

## 2024-12-30 PROCEDURE — 85025 COMPLETE CBC W/AUTO DIFF WBC: CPT

## 2024-12-30 PROCEDURE — 2500000003 HC RX 250 WO HCPCS: Performed by: RADIOLOGY

## 2024-12-30 PROCEDURE — 81001 URINALYSIS AUTO W/SCOPE: CPT

## 2024-12-30 PROCEDURE — 87086 URINE CULTURE/COLONY COUNT: CPT

## 2024-12-30 PROCEDURE — 83690 ASSAY OF LIPASE: CPT

## 2024-12-30 PROCEDURE — 74177 CT ABD & PELVIS W/CONTRAST: CPT

## 2024-12-30 RX ORDER — SODIUM CHLORIDE 0.9 % (FLUSH) 0.9 %
10 SYRINGE (ML) INJECTION PRN
Status: DISCONTINUED | OUTPATIENT
Start: 2024-12-30 | End: 2024-12-31 | Stop reason: HOSPADM

## 2024-12-30 RX ORDER — ACETAMINOPHEN 500 MG
1000 TABLET ORAL ONCE
Status: COMPLETED | OUTPATIENT
Start: 2024-12-30 | End: 2024-12-30

## 2024-12-30 RX ORDER — PROCHLORPERAZINE EDISYLATE 5 MG/ML
10 INJECTION INTRAMUSCULAR; INTRAVENOUS ONCE
Status: DISCONTINUED | OUTPATIENT
Start: 2024-12-30 | End: 2024-12-31 | Stop reason: HOSPADM

## 2024-12-30 RX ORDER — 0.9 % SODIUM CHLORIDE 0.9 %
1000 INTRAVENOUS SOLUTION INTRAVENOUS ONCE
Status: COMPLETED | OUTPATIENT
Start: 2024-12-30 | End: 2024-12-30

## 2024-12-30 RX ORDER — IOPAMIDOL 755 MG/ML
75 INJECTION, SOLUTION INTRAVASCULAR
Status: COMPLETED | OUTPATIENT
Start: 2024-12-30 | End: 2024-12-30

## 2024-12-30 RX ADMIN — Medication 10 ML: at 20:39

## 2024-12-30 RX ADMIN — IOPAMIDOL 75 ML: 755 INJECTION, SOLUTION INTRAVENOUS at 20:35

## 2024-12-30 RX ADMIN — SODIUM CHLORIDE 1000 ML: 9 INJECTION, SOLUTION INTRAVENOUS at 18:16

## 2024-12-30 RX ADMIN — ACETAMINOPHEN 1000 MG: 500 TABLET, FILM COATED ORAL at 18:22

## 2024-12-30 ASSESSMENT — LIFESTYLE VARIABLES
HOW MANY STANDARD DRINKS CONTAINING ALCOHOL DO YOU HAVE ON A TYPICAL DAY: PATIENT DOES NOT DRINK
HOW OFTEN DO YOU HAVE A DRINK CONTAINING ALCOHOL: NEVER

## 2024-12-30 ASSESSMENT — PAIN - FUNCTIONAL ASSESSMENT: PAIN_FUNCTIONAL_ASSESSMENT: NONE - DENIES PAIN

## 2024-12-30 NOTE — ED PROVIDER NOTES
determinants of health include stress  Patient family advised to return to ED if she has any worsening of symptoms at all they are agreeable to plan         ED Course as of 01/03/25 0003   Mon Dec 30, 2024   1826 EKG:  This EKG is signed and interpreted by me.    Rate: 100  Rhythm: Sinus  Interpretation: non-specific EKG, no st elevation, left axis  Comparison: changes compared to previous EKG   [SS]   Tue Dec 31, 2024   0325 Pt requesting additional dose of tylenol prior to dc, order placed [LV]      ED Course User Index  [LV] Carrie Gusman, ALON - CNP  [SS] Tara Billings MD       SEP-1 CORE MEASURE DATA      Sepsis Criteria   Severe Sepsis Criteria   Septic Shock Criteria     Must be confirmed or suspected to move forward with diagnosis of sepsis.    Must meet 2:    [] Temperature > 100.9 F (38.3 C)        or < 96.8 F (36 C)  [] HR > 90  [] RR > 20  [] WBC > 12 or < 4 or 10% bands    AND:    [] Infection Confirmed or        Suspected.    OR:    [x] Exclude from SEP-1 because:    [] No infection present or suspected  [] Does not have 2+ SIRS criteria but may have an incidental infection that requires treatment  [] May have sepsis, but does not meet criteria for severe sepsis or septic shock  [] Alternative explanation for abnormal labs and/or vitals (see MDM)  [x] Viral etiology found or highly suspected (including COVID-19) without concomitant bacterial infection   Must meet 1:    [] Lactate > 2       or   [] Signs of Organ Dysfunction:    - SBP < 90 or MAP < 65  - Altered mental status  - Creatinine > 2 or increased from      baseline  - Urine Output < 0.5 ml/kg/hr  - Bilirubin > 2  - INR > 1.5 (not anticoagulated)  - Platelets < 100,000  - Acute Respiratory Failure as     evidenced by new need for NIPPV     or mechanical ventilation        [] No criteria met for Severe Sepsis.   Must meet 1:    [] Lactate > 4        or   [] SBP < 90 or MAP < 65 for at        least two readings in the first        hour

## 2024-12-31 VITALS
HEART RATE: 86 BPM | RESPIRATION RATE: 16 BRPM | DIASTOLIC BLOOD PRESSURE: 95 MMHG | SYSTOLIC BLOOD PRESSURE: 159 MMHG | TEMPERATURE: 100.7 F | OXYGEN SATURATION: 97 %

## 2024-12-31 LAB
EKG ATRIAL RATE: 100 BPM
EKG P AXIS: 52 DEGREES
EKG P-R INTERVAL: 190 MS
EKG Q-T INTERVAL: 372 MS
EKG QRS DURATION: 130 MS
EKG QTC CALCULATION (BAZETT): 479 MS
EKG R AXIS: -57 DEGREES
EKG T AXIS: 72 DEGREES
EKG VENTRICULAR RATE: 100 BPM
MICROORGANISM SPEC CULT: ABNORMAL
MICROORGANISM SPEC CULT: ABNORMAL
SERVICE CMNT-IMP: ABNORMAL
SPECIMEN DESCRIPTION: ABNORMAL

## 2024-12-31 PROCEDURE — 6370000000 HC RX 637 (ALT 250 FOR IP): Performed by: NURSE PRACTITIONER

## 2024-12-31 PROCEDURE — 6370000000 HC RX 637 (ALT 250 FOR IP): Performed by: STUDENT IN AN ORGANIZED HEALTH CARE EDUCATION/TRAINING PROGRAM

## 2024-12-31 PROCEDURE — 93010 ELECTROCARDIOGRAM REPORT: CPT | Performed by: INTERNAL MEDICINE

## 2024-12-31 RX ORDER — DOXYCYCLINE HYCLATE 100 MG
100 TABLET ORAL 2 TIMES DAILY
Qty: 14 TABLET | Refills: 0 | Status: SHIPPED | OUTPATIENT
Start: 2024-12-31 | End: 2025-01-07

## 2024-12-31 RX ORDER — DOXYCYCLINE 100 MG/1
100 CAPSULE ORAL ONCE
Status: COMPLETED | OUTPATIENT
Start: 2024-12-31 | End: 2024-12-31

## 2024-12-31 RX ORDER — AZITHROMYCIN 250 MG/1
500 TABLET, FILM COATED ORAL ONCE
Status: DISCONTINUED | OUTPATIENT
Start: 2024-12-31 | End: 2024-12-31

## 2024-12-31 RX ORDER — ONDANSETRON 4 MG/1
4 TABLET, ORALLY DISINTEGRATING ORAL 3 TIMES DAILY PRN
Qty: 9 TABLET | Refills: 0 | Status: SHIPPED | OUTPATIENT
Start: 2024-12-31 | End: 2025-01-03

## 2024-12-31 RX ORDER — ACETAMINOPHEN 500 MG
1000 TABLET ORAL ONCE
Status: COMPLETED | OUTPATIENT
Start: 2024-12-31 | End: 2024-12-31

## 2024-12-31 RX ADMIN — DOXYCYCLINE HYCLATE 100 MG: 100 CAPSULE ORAL at 02:58

## 2024-12-31 RX ADMIN — ACETAMINOPHEN 1000 MG: 500 TABLET, FILM COATED ORAL at 03:28

## 2024-12-31 ASSESSMENT — ENCOUNTER SYMPTOMS
SINUS PAIN: 1
SINUS PRESSURE: 1
DIARRHEA: 1
COUGH: 1
EYES NEGATIVE: 1

## 2024-12-31 NOTE — CONSULTS
GENERAL SURGERY  CONSULT NOTE    Patient's Name/Date of Birth: Luzmaria Montes / 10/19/1933    Date: December 31, 2024     PCP: Paco Sykes DO     Chief Complaint:   Chief Complaint   Patient presents with    Diarrhea     X 3 days, fevers and generalized aches, 500ml NS given by EMS       Physician Consulted: Dr. Hilton  Reason for Consult: appendicitis     HPI:  Luzmaria Montes is a 89 y.o. female with history of CHF, pacemaker, and diverticulosis who presented to the ED for febrile illness.  Patient states approximately 1 week ago she began to have intermittent fevers, fatigue, and cold-like symptoms.  Family wanted to bring the patient in due to inability to break her fever at home with over-the-counter medications.  Patient also endorses diarrhea.  She has been able to tolerate a diet.  Denies any abdominal pain, nausea, or vomiting.  Intra-abdominal surgical history includes hysterectomy.  She had a colonoscopy in 2018 remarkable for diverticulosis.  Her last echo was in July 2024 showing EF 55 to 60% as well as pulmonary hypertension.  She was initially febrile at 102.9 °F with tachycardia max 107 bpm.  She has been stable on room air and her fever is since resolved.  Labs remarkable for positive influenza A, WBC 4.4.  CT scan shows diarrheal state with fluid-filled appendix and colon as well as diverticulosis in the sigmoid colon.  Her appendix is fluid-filled and dilated.  Negative for fecalith or periappendiceal stranding or fluid.    Past Medical History:   Diagnosis Date    Arthritis     Encounter for screening colonoscopy     Hyperlipidemia     Hypertension     Thyroid disease     Thyroid nodule        Past Surgical History:   Procedure Laterality Date    COLONOSCOPY      ENDOSCOPY, COLON, DIAGNOSTIC      EP DEVICE PROCEDURE N/A 7/11/2024    Insert PPM dual performed by Ingrid Bey MD at [facility]    HYSTERECTOMY (CERVIX STATUS UNKNOWN)      MD COLONOSCOPY FLX DX W/COLLJ SPEC WHEN

## 2024-12-31 NOTE — ED NOTES
AVS provided and reviewed with patient and daughter. Both verbalized understanding of DC instructions, medication prescriptions, and follow up care. Pain is reported as tolerable and is tolerating PO intake.

## 2024-12-31 NOTE — DISCHARGE INSTRUCTIONS
CT ABDOMEN PELVIS W IV CONTRAST Additional Contrast? None   Final Result   1. The appendix is mildly dilated at 7 mm, thick-walled fluid-filled with no   adjacent inflammatory stranding. Correlate with right lower quadrant pain and   point tenderness and elevated white blood cell count for acute appendicitis.   2. Fluid-filled portions of the large and small bowel.   3. Cardiomegaly.

## 2025-01-03 ASSESSMENT — ENCOUNTER SYMPTOMS
NAUSEA: 1
SHORTNESS OF BREATH: 0
ABDOMINAL PAIN: 1
PHOTOPHOBIA: 0
DIARRHEA: 1
COUGH: 0
VOMITING: 1
ABDOMINAL DISTENTION: 0
CHEST TIGHTNESS: 0

## 2025-02-20 ENCOUNTER — HOSPITAL ENCOUNTER (EMERGENCY)
Age: 89
Discharge: HOME | End: 2025-02-20
Attending: STUDENT IN AN ORGANIZED HEALTH CARE EDUCATION/TRAINING PROGRAM
Payer: MEDICARE

## 2025-02-20 ENCOUNTER — APPOINTMENT (OUTPATIENT)
Dept: CT IMAGING | Age: 89
End: 2025-02-20
Payer: MEDICARE

## 2025-02-20 VITALS
OXYGEN SATURATION: 98 % | BODY MASS INDEX: 29.99 KG/M2 | TEMPERATURE: 99 F | HEIGHT: 65 IN | HEART RATE: 86 BPM | SYSTOLIC BLOOD PRESSURE: 163 MMHG | RESPIRATION RATE: 16 BRPM | DIASTOLIC BLOOD PRESSURE: 74 MMHG | WEIGHT: 180 LBS

## 2025-02-20 DIAGNOSIS — K52.9 COLITIS: Primary | ICD-10-CM

## 2025-02-20 DIAGNOSIS — E87.20 LACTIC ACIDOSIS: ICD-10-CM

## 2025-02-20 DIAGNOSIS — R10.84 GENERALIZED ABDOMINAL PAIN: ICD-10-CM

## 2025-02-20 DIAGNOSIS — K57.90 DIVERTICULOSIS: ICD-10-CM

## 2025-02-20 LAB
ALBUMIN SERPL-MCNC: 3.8 G/DL (ref 3.5–5.2)
ALP SERPL-CCNC: 85 U/L (ref 35–104)
ALT SERPL-CCNC: 12 U/L (ref 0–32)
ANION GAP SERPL CALCULATED.3IONS-SCNC: 11 MMOL/L (ref 7–16)
AST SERPL-CCNC: 17 U/L (ref 0–31)
BASOPHILS # BLD: 0.03 K/UL (ref 0–0.2)
BASOPHILS NFR BLD: 1 % (ref 0–2)
BILIRUB DIRECT SERPL-MCNC: <0.2 MG/DL (ref 0–0.3)
BILIRUB INDIRECT SERPL-MCNC: NORMAL MG/DL (ref 0–1)
BILIRUB SERPL-MCNC: 0.4 MG/DL (ref 0–1.2)
BILIRUB UR QL STRIP: NEGATIVE
BUN SERPL-MCNC: 12 MG/DL (ref 6–23)
CALCIUM SERPL-MCNC: 9.7 MG/DL (ref 8.6–10.2)
CHLORIDE SERPL-SCNC: 101 MMOL/L (ref 98–107)
CLARITY UR: CLEAR
CO2 SERPL-SCNC: 27 MMOL/L (ref 22–29)
COLOR UR: YELLOW
CREAT SERPL-MCNC: 1 MG/DL (ref 0.5–1)
EKG ATRIAL RATE: 92 BPM
EKG P AXIS: 59 DEGREES
EKG P-R INTERVAL: 174 MS
EKG Q-T INTERVAL: 400 MS
EKG QRS DURATION: 150 MS
EKG QTC CALCULATION (BAZETT): 494 MS
EKG R AXIS: -65 DEGREES
EKG T AXIS: 53 DEGREES
EKG VENTRICULAR RATE: 92 BPM
EOSINOPHIL # BLD: 0.06 K/UL (ref 0.05–0.5)
EOSINOPHILS RELATIVE PERCENT: 2 % (ref 0–6)
ERYTHROCYTE [DISTWIDTH] IN BLOOD BY AUTOMATED COUNT: 13.9 % (ref 11.5–15)
GFR, ESTIMATED: 54 ML/MIN/1.73M2
GLUCOSE SERPL-MCNC: 142 MG/DL (ref 74–99)
GLUCOSE UR STRIP-MCNC: NEGATIVE MG/DL
HCT VFR BLD AUTO: 41.8 % (ref 34–48)
HGB BLD-MCNC: 13.3 G/DL (ref 11.5–15.5)
HGB UR QL STRIP.AUTO: ABNORMAL
IMM GRANULOCYTES # BLD AUTO: <0.03 K/UL (ref 0–0.58)
IMM GRANULOCYTES NFR BLD: 0 % (ref 0–5)
KETONES UR STRIP-MCNC: NEGATIVE MG/DL
LACTATE BLDV-SCNC: 1.2 MMOL/L (ref 0.5–2.2)
LACTATE BLDV-SCNC: 2.7 MMOL/L (ref 0.5–2.2)
LEUKOCYTE ESTERASE UR QL STRIP: NEGATIVE
LIPASE SERPL-CCNC: 26 U/L (ref 13–60)
LYMPHOCYTES NFR BLD: 1.89 K/UL (ref 1.5–4)
LYMPHOCYTES RELATIVE PERCENT: 47 % (ref 20–42)
MAGNESIUM SERPL-MCNC: 2 MG/DL (ref 1.6–2.6)
MCH RBC QN AUTO: 31.8 PG (ref 26–35)
MCHC RBC AUTO-ENTMCNC: 31.8 G/DL (ref 32–34.5)
MCV RBC AUTO: 100 FL (ref 80–99.9)
MONOCYTES NFR BLD: 0.35 K/UL (ref 0.1–0.95)
MONOCYTES NFR BLD: 9 % (ref 2–12)
NEUTROPHILS NFR BLD: 42 % (ref 43–80)
NEUTS SEG NFR BLD: 1.68 K/UL (ref 1.8–7.3)
NITRITE UR QL STRIP: NEGATIVE
PH UR STRIP: 6 [PH] (ref 5–8)
PLATELET # BLD AUTO: 162 K/UL (ref 130–450)
PMV BLD AUTO: 10.5 FL (ref 7–12)
POTASSIUM SERPL-SCNC: 4.3 MMOL/L (ref 3.5–5)
PROT SERPL-MCNC: 7.9 G/DL (ref 6.4–8.3)
PROT UR STRIP-MCNC: NEGATIVE MG/DL
RBC # BLD AUTO: 4.18 M/UL (ref 3.5–5.5)
RBC #/AREA URNS HPF: ABNORMAL /HPF
SODIUM SERPL-SCNC: 139 MMOL/L (ref 132–146)
SP GR UR STRIP: 1.01 (ref 1–1.03)
TROPONIN I SERPL HS-MCNC: 9 NG/L (ref 0–9)
UROBILINOGEN UR STRIP-ACNC: 0.2 EU/DL (ref 0–1)
WBC #/AREA URNS HPF: ABNORMAL /HPF
WBC OTHER # BLD: 4 K/UL (ref 4.5–11.5)

## 2025-02-20 PROCEDURE — 2500000003 HC RX 250 WO HCPCS: Performed by: STUDENT IN AN ORGANIZED HEALTH CARE EDUCATION/TRAINING PROGRAM

## 2025-02-20 PROCEDURE — 87086 URINE CULTURE/COLONY COUNT: CPT

## 2025-02-20 PROCEDURE — 85025 COMPLETE CBC W/AUTO DIFF WBC: CPT

## 2025-02-20 PROCEDURE — 93010 ELECTROCARDIOGRAM REPORT: CPT | Performed by: INTERNAL MEDICINE

## 2025-02-20 PROCEDURE — 84484 ASSAY OF TROPONIN QUANT: CPT

## 2025-02-20 PROCEDURE — 96372 THER/PROPH/DIAG INJ SC/IM: CPT

## 2025-02-20 PROCEDURE — 74177 CT ABD & PELVIS W/CONTRAST: CPT

## 2025-02-20 PROCEDURE — 80053 COMPREHEN METABOLIC PANEL: CPT

## 2025-02-20 PROCEDURE — 83735 ASSAY OF MAGNESIUM: CPT

## 2025-02-20 PROCEDURE — 99285 EMERGENCY DEPT VISIT HI MDM: CPT

## 2025-02-20 PROCEDURE — 83605 ASSAY OF LACTIC ACID: CPT

## 2025-02-20 PROCEDURE — 6360000004 HC RX CONTRAST MEDICATION: Performed by: RADIOLOGY

## 2025-02-20 PROCEDURE — 82248 BILIRUBIN DIRECT: CPT

## 2025-02-20 PROCEDURE — 96374 THER/PROPH/DIAG INJ IV PUSH: CPT

## 2025-02-20 PROCEDURE — 96361 HYDRATE IV INFUSION ADD-ON: CPT

## 2025-02-20 PROCEDURE — 6360000002 HC RX W HCPCS: Performed by: STUDENT IN AN ORGANIZED HEALTH CARE EDUCATION/TRAINING PROGRAM

## 2025-02-20 PROCEDURE — 2580000003 HC RX 258: Performed by: STUDENT IN AN ORGANIZED HEALTH CARE EDUCATION/TRAINING PROGRAM

## 2025-02-20 PROCEDURE — 81001 URINALYSIS AUTO W/SCOPE: CPT

## 2025-02-20 PROCEDURE — 93005 ELECTROCARDIOGRAM TRACING: CPT | Performed by: STUDENT IN AN ORGANIZED HEALTH CARE EDUCATION/TRAINING PROGRAM

## 2025-02-20 PROCEDURE — 83690 ASSAY OF LIPASE: CPT

## 2025-02-20 RX ORDER — DICYCLOMINE HYDROCHLORIDE 10 MG/ML
20 INJECTION INTRAMUSCULAR ONCE
Status: COMPLETED | OUTPATIENT
Start: 2025-02-20 | End: 2025-02-20

## 2025-02-20 RX ORDER — DICYCLOMINE HCL 20 MG
20 TABLET ORAL EVERY 6 HOURS PRN
Qty: 20 TABLET | Refills: 0 | Status: SHIPPED | OUTPATIENT
Start: 2025-02-20

## 2025-02-20 RX ORDER — IOPAMIDOL 755 MG/ML
75 INJECTION, SOLUTION INTRAVASCULAR
Status: COMPLETED | OUTPATIENT
Start: 2025-02-20 | End: 2025-02-20

## 2025-02-20 RX ORDER — 0.9 % SODIUM CHLORIDE 0.9 %
1000 INTRAVENOUS SOLUTION INTRAVENOUS ONCE
Status: COMPLETED | OUTPATIENT
Start: 2025-02-20 | End: 2025-02-20

## 2025-02-20 RX ADMIN — IOPAMIDOL 75 ML: 755 INJECTION, SOLUTION INTRAVENOUS at 14:40

## 2025-02-20 RX ADMIN — SODIUM CHLORIDE 1000 ML: 9 INJECTION, SOLUTION INTRAVENOUS at 15:22

## 2025-02-20 RX ADMIN — DICYCLOMINE HYDROCHLORIDE 20 MG: 10 INJECTION, SOLUTION INTRAMUSCULAR at 13:11

## 2025-02-20 RX ADMIN — FAMOTIDINE 20 MG: 10 INJECTION, SOLUTION INTRAVENOUS at 13:09

## 2025-02-20 ASSESSMENT — LIFESTYLE VARIABLES
HOW OFTEN DO YOU HAVE A DRINK CONTAINING ALCOHOL: NEVER
HOW MANY STANDARD DRINKS CONTAINING ALCOHOL DO YOU HAVE ON A TYPICAL DAY: PATIENT DOES NOT DRINK

## 2025-02-20 ASSESSMENT — PAIN SCALES - GENERAL
PAINLEVEL_OUTOF10: 4
PAINLEVEL_OUTOF10: 9

## 2025-02-20 ASSESSMENT — PAIN - FUNCTIONAL ASSESSMENT
PAIN_FUNCTIONAL_ASSESSMENT: 0-10
PAIN_FUNCTIONAL_ASSESSMENT: PREVENTS OR INTERFERES SOME ACTIVE ACTIVITIES AND ADLS

## 2025-02-20 ASSESSMENT — PAIN DESCRIPTION - LOCATION: LOCATION: ABDOMEN;BACK

## 2025-02-20 ASSESSMENT — PAIN DESCRIPTION - ORIENTATION: ORIENTATION: RIGHT

## 2025-02-20 NOTE — DISCHARGE INSTRUCTIONS
Please return to the ER for any new or worsening symptoms including but not limited to Fever, Worsening abdominal pain, or stool that appears bloody or dark/tarry  If prescribed, please be sure to  your prescriptions from the pharmacy  Please follow-up with Primary care provider and Gastroenterology as instructed        CT ABDOMEN PELVIS W IV CONTRAST Additional Contrast? None   Final Result   1. There is some wall thickening in the right-side of the colon. This could   be due to underdistention or focal colitis. Follow-up evaluation is   recommended.   2. There is some wall thickening along the left side of the base of the   bladder. Correlate with urinalysis. This could be related to cystitis.   3. There is some bibasilar bronchiectasis.   4. There is some gallbladder distension.   5. There is some scattered colonic diverticula without diverticulitis.  There   is more significant diverticulosis in the sigmoid   6. There is diffuse degenerative changes in the lower thoracic spine and in   the lumbar spine with multiple areas of significant canal stenosis.

## 2025-02-20 NOTE — ED PROVIDER NOTES
of this note are included below.      EKG reviewed and interpreted by me, TIME:  This EKG is signed by me, Jana Villela DO.     See ED course for EKG documentation.    All labs & imaging were reviewed and interpreted by me, see RESULTS. I have personally reviewed all laboratory and imaging results for this patient.       Are there any additional factors to consider that affect care (uninsured, homeless, illiterate, history from another source, etc.) (If yes, which ones).  No       This patient's ED course included: a personal history and physicial examination, re-evaluation prior to disposition, multiple bedside re-evaluations, IV medications, and complex medical decision making and emergency management    This patient has remained unchanged during their ED course.    Counseling:   The emergency provider has spoken with the patient and discussed today’s results, in addition to providing specific details for the plan of care and counseling regarding the diagnosis and prognosis.  Questions are answered at this time and they are agreeable with the plan.    CONSULTS:   None       Please see ED course for any additional MDM documentation.       CRITICAL CARE TIME (.cct)     0 minutes            I am the Primary Clinician of Record.    FINAL IMPRESSION      1. Colitis    2. Generalized abdominal pain    3. Diverticulosis    4. Lactic acidosis          DISPOSITION/PLAN     DISPOSITION Decision To Discharge 02/20/2025 05:30:16 PM    Disposition: Discharge to home  Patient condition is stable      PATIENT REFERRED TO:  Paco Sykes DO  [address] [facility]  [address]  [facility] [address]  [phone]    Call in 1 day  For follow-up    Jordan Blank MD  [address] [place] [address] [address] [place] [id number] [phone]    Call in 1 day  For follow-up    [facility]  [address] [facility] [address] [phone]  Go to   As needed, If symptoms worsen      DISCHARGE  flu-like symptoms

## 2025-02-21 ASSESSMENT — ENCOUNTER SYMPTOMS
ABDOMINAL PAIN: 1
BLOOD IN STOOL: 0
COUGH: 0
DIARRHEA: 0
CONSTIPATION: 0
VOMITING: 0
SHORTNESS OF BREATH: 0
NAUSEA: 0

## 2025-02-22 LAB
MICROORGANISM SPEC CULT: NO GROWTH
SPECIMEN DESCRIPTION: NORMAL

## 2025-05-30 ENCOUNTER — TELEPHONE (OUTPATIENT)
Dept: NON INVASIVE DIAGNOSTICS | Age: 89
End: 2025-05-30

## 2025-05-30 NOTE — TELEPHONE ENCOUNTER
Regine Gastro call to see if we received a fax from 4/30/25 for ok for pt to have coloscopy   I advised it was not in her cart and She stated she would refax

## 2025-06-02 ENCOUNTER — TELEPHONE (OUTPATIENT)
Dept: NON INVASIVE DIAGNOSTICS | Age: 89
End: 2025-06-02

## 2025-06-02 NOTE — TELEPHONE ENCOUNTER
Note faxed to Regine Bales.     Electronically signed by Natalie Coker MA on 6/2/2025 at 11:04 AM

## 2025-06-02 NOTE — TELEPHONE ENCOUNTER
----- Message from ALON Juarez - CNP sent at 6/2/2025  9:57 AM EDT -----  Luzmaria Montes has a history of intermittent complete heart block status post dual-chamber [place] ([employer]ios) pacemaker on  7/11/2024 and is not a contraindication for colonoscopy.  Cardiac clearance must be obtained via the cardiology service, electrophysiology is unable to give cardiac clearance.

## (undated) DEVICE — GRADUATE TRIANG MEASURE 1000ML BLK PRNT

## (undated) DEVICE — AGENT HEMOSTATIC SURGIFLOW MATRIX KIT W/THROMBIN

## (undated) DEVICE — PENCIL ES BTTN SWCH W TIP HOLSTER E Z CLN

## (undated) DEVICE — PAD, DEFIB, ADULT, RADIOTRAN, PHYSIO, LO: Brand: MEDLINE

## (undated) DEVICE — CABLE PACE L12FT ALGTR CLP DISP FOR PACE SYS ANALZR MERLIN

## (undated) DEVICE — INTRODUCER LD L13CM OD6FR SPLITTABLE DIL W/ J TIP GWIRE SYR